# Patient Record
Sex: MALE | Race: WHITE | URBAN - METROPOLITAN AREA
[De-identification: names, ages, dates, MRNs, and addresses within clinical notes are randomized per-mention and may not be internally consistent; named-entity substitution may affect disease eponyms.]

---

## 2018-10-09 ENCOUNTER — ANTI-COAG VISIT (OUTPATIENT)
Dept: CARDIOLOGY CLINIC | Age: 58
End: 2018-10-09

## 2021-02-17 ENCOUNTER — HOSPITAL ENCOUNTER (OUTPATIENT)
Dept: PHYSICAL THERAPY | Age: 61
Setting detail: THERAPIES SERIES
Discharge: HOME | End: 2021-02-17
Payer: COMMERCIAL

## 2021-02-17 PROCEDURE — 97110 THERAPEUTIC EXERCISES: CPT | Performed by: PHYSICAL THERAPIST

## 2021-02-17 PROCEDURE — 97161 PT EVAL LOW COMPLEX 20 MIN: CPT | Performed by: PHYSICAL THERAPIST

## 2021-02-17 NOTE — PLAN OF CARE
[facility] and [facility]      Physical Therapy Certification    Dear Referring Practitioner: Antonieta Bruno NP,    We had the pleasure of evaluating the following patient for physical therapy services at [address] [place] [address]. A summary of our findings can be found in the initial assessment below. This includes our plan of care. If you have any questions or concerns regarding these findings, please do not hesitate to contact me at the office phone number checked above. Thank you for the referral.       Physician Signature:_______________________________Date:__________________  By signing above (or electronic signature), therapists plan is approved by physician      Patient: Mayra Otero   : 1960   MRN: [id number]  Referring Physician: Referring Practitioner: Antonieta Bruno NP      Evaluation Date: 2021      Medical Diagnosis Information:  Diagnosis: Herniated Lumbar Disc (ICD-722.100)(GSJ93-O69.26)   Treatment Diagnosis: M54.5, M25.651                                         Insurance information: PT Insurance Information: PT BENEFITS  FACILITY/ [facility]/ EFFECTIVE 2016/ ACTIVE/ DED 2400 .36/ PAYS 80%/ OOP 5900 .36/ NO VISIT LIMIT MED NEC/ 0 AUTH/ ALL CODES BILLABLE/ TELEHEALTH IS NOT COVERED UNLES COVID RELATED/ FARHEEN REF# [id number]/ 2021 PAG    C-SSRS Triggered by Intake questionnaire (Past 2 wk assessment):   [x] No, Questionnaire did not trigger screening.   [] Yes, Patient intake triggered further evaluation      [] C-SSRS Screening completed  [] PCP notified via Plan of Care  [] Emergency services notified     Latex Allergy:  [x]NO      []YES  Preferred Language for Healthcare:   [x]English       []other:    SUBJECTIVE: Patient stated complaint: Pt reports R back/leg pain that occurs when he goes on an extended walk (after half mile or mile). Starts to feel pain in lower back, back of leg, and into calf. Usually goes on a 4 mile walk every day for exercise and by the end needs to stop around every 100 yards and do a sidebend to the L in order to continue. Walks on mostly level ground, walking up or down on gradients has made it better in the past. Back/leg pain has been going on over a year, insidious onset, has been getting progressively worse. Doctor stated he had herniated disk at L4/L5. Has tried Tylenol for pain, does not help. Pain is sharp and burning, feels like he sprained his butt, calf feels like a cramp, \"locked up. \"  Had an epidural Thursday, pain relief for following two days;. yesterday was the worst its ever been. Pt has follow-up with Marcela March 8.       Hobbies: walking, hiking    Goals: walk pain-free    Relevant Medical History:see intake form; R AAYUSH 9/2020, L3/4 fusion 2018  Functional Disability Index  2/17/21: AUGUSTIN 60% deficit    Pain Scale: 0/10 current  Easing factors: stretch   Provocative factors: walking for long periods of time    Type: []Constant           [x]Intermittent      []Radiating         []Localized         []other: pain only occurs with prolonged walking                Numbness/Tingling: none     Occupation/School: retired     Living Status/Prior Level of Function: Independent with ADLs and IADLs     OBJECTIVE:     Standing Exam Normal Abnormal N/A    Toe walk     x    Heel Walk   x    Side bending x      Pelvic Height x      Fwd Bend- (aberrant juttering or innominate mvmt) x      Extension x      Trendelenburg x      Kemps/Quadrant   x    Stork  x  Minimal pelvic tilt B   SLS/SLS w rotation   x                  Seated Exam Normal Abnormal N/A Comments   Pelvic Height   x    Seated Rotation   x    Seated flexion x      B hip IR   x                  Supine Exam Normal Abnormal N/A Comments   Hip flexion x      Abduction  x  + for tightness   ER  x  + for tightness   IR  x  + for tightness   TAN/Ankush  x  + for tightness  R knee 32cm from table  L knee 18 cm from table achilles      S1-2 Prone knee bend      L3-4 Patellar tendon      C5-6 Biceps      C6 Brachioradialis      C7-8 Triceps      Clonus      Babinski      Mancia's        Joint mobility:    []Normal    [x]Hypo R hip d/t AAYUSH   []Hyper    Palpation: Assess next visit    Functional Mobility/Transfers: Independent with all mobility    Posture: WFL    Gait: (include devices/WB status) WFL                         [x] Patient history, allergies, meds reviewed. Medical chart reviewed. See intake form. Review Of Systems (ROS):  [x]Performed Review of systems (Integumentary, CardioPulmonary, Neurological) by intake and observation. Intake form has been scanned into medical record. Patient has been instructed to contact their primary care physician regarding ROS issues if not already being addressed at this time.        Co-morbidities/Complexities (which will affect course of rehabilitation):   []None              Arthritic conditions   []Rheumatoid arthritis (M05.9)  []Osteoarthritis (M19.91)    Cardiovascular conditions   []Hypertension (I10)  []Hyperlipidemia (E78.5)  []Angina pectoris (I20)  []Atherosclerosis (I70)    Musculoskeletal conditions   [x]Disc pathology   []Congenital spine pathologies   [x]Prior surgical intervention  []Osteoporosis (M81.8)  []Osteopenia (M85.8)   Endocrine conditions   []Hypothyroid (E03.9)  []Hyperthyroid Gastrointestinal conditions   []Constipation (B02.35)    Metabolic conditions   []Morbid obesity (E66.01)  []Diabetes type 1(E10.65) or 2 (E11.65)   []Neuropathy (G60.9)      Pulmonary conditions   []Asthma (J45)  []Coughing   []COPD (J44.9)    Psychological Disorders  [x]Anxiety (F41.9)  [x]Depression (F32.9)   []Other:    []Other:             Barriers to/and or personal factors that will affect rehab potential:              []Age  []Sex              [x]Motivation                        []Co-Morbidities              []Cognitive Function, education/learning barriers []Environmental, home barriers              []profession/work barriers  [x]past PT/medical experience  []other:  Justification: Pt is familiar with PT due to past surgeries; pt is active and has strong desire to return to [facility]      Falls Risk Assessment (30 days):   [x] Falls Risk assessed and no intervention required. [] Falls Risk assessed and Patient requires intervention due to being higher risk   TUG score (>12s at risk):     [] Falls education provided, including      ASSESSMENT:   Functional Impairments:                [x]Noted lumbar/proximal hip hypomobility              []Noted lumbosacral and/or generalized hypermobility              [x]Decreased Lumbosacral/hip/LE functional ROM              [x]Decreased core/proximal hip strength and neuromuscular control               []Decreased LE functional strength               []Abnormal reflexes/sensation/myotomal/dermatomal deficits  []Reduced balance/proprioceptive control               []other:       Functional Activity Limitations (from functional questionnaire and intake)              []Reduced ability to tolerate prolonged functional positions              []Reduced ability or difficulty with changes of positions or transfers between positions              []Reduced ability to maintain good posture and demonstrate good body mechanics with sitting, bending, and lifting              []Reduced ability to sleep              [] Reduced ability or tolerance with driving and/or computer work              []Reduced ability to perform lifting, reaching, carrying tasks              []Reduced ability to squat              []Reduced ability to forward bend              []Reduced ability to ambulate prolonged functional periods/distances/surfaces              []Reduced ability to ascend/descend stairs              [x]other: Reduced ability to walk for prolonged periods.       Participation Restrictions              []Reduced participation in self care activities and measures addressing any of the following: body structures and functions (impairments), activity limitations, and/or participation restrictions;:  [] a total of 1-2 or more elements   [x] a total of 3 or more elements   [] a total of 4 or more elements   [x] A clinical presentation with:  [x] stable and/or uncomplicated characteristics   [] evolving clinical presentation with changing characteristics  [] unstable and unpredictable characteristics;   [x] Clinical decision making of [x] low, [] moderate, [] high complexity using standardized patient assessment instrument and/or measurable assessment of functional outcome. [x] EVAL (LOW) 09865 (typically 20 minutes face-to-face)  [] EVAL (MOD) 23952 (typically 30 minutes face-to-face)  [] EVAL (HIGH) 47944 (typically 45 minutes face-to-face)  [] RE-EVAL         PLAN:      Frequency/Duration:  1-2 days per week for 8 Weeks:  Interventions:  [x]  Therapeutic exercise including: strength training, ROM, for LE, Glutes and core   [x]  NMR activation and proprioception for glutes , LE and Core   [x]  Manual therapy as indicated for Hip complex, LE and spine to include: Dry Needling/IASTM, STM, PROM, Gr I-IV mobilizations, manipulation. [x]  Modalities as needed that may include: thermal agents, E-stim, Biofeedback, US, iontophoresis as indicated  [x]  Patient education on joint protection, postural re-education, activity modification, progression of HEP. HEP instruction:     Access Code: [email or web address]   URL: [email or web address]   Date: 02/17/2021   Prepared by: Dayton Medel     Exercises   Supine Figure 4 Piriformis Stretch - 3 reps - 1 sets - 30 second hold - 1x daily - 7x weekly   Supine ITB Stretch with Strap - 3 reps - 1 sets - 30 second hold - 1x daily - 7x weekly   Prone Quadriceps Stretch with Strap - 3 reps - 1 sets - 30 second hold - 1x daily - 7x weekly   Clamshell with Resistance - 10 reps - 3 sets - 1x daily - 7x weekly   Supine Bridge - 10 reps - 3 sets - 1x daily - 7x weekly        GOALS:   Patient stated goal: to walk long distances painfree    [] Progressing: [] Met: [] Not Met: [] Adjusted    Therapist goals for Patient:   Short Term Goals: To be achieved in: 4 weeks  3/14/21  1. Independent in HEP and progression per patient tolerance, in order to prevent re-injury. (at 1 week 2/24/21)  [] Progressing: [] Met: [] Not Met: [] Adjusted   2. Patient will have demonstrate PROM [place] [place] for R hip to improve hip mobility. [] Progressing: [] Met: [] Not Met: [] Adjusted   3. Patient will tolerate 2 miles of walking without increased symptoms or restriction. [] Progressing: [] Met: [] Not Met: [] Adjusted    Long Term Goals: To be achieved in: 8 weeks 4/14/21  1. Disability index score of 0% for the AUGUSTIN to assist with reaching prior level of function. [] Progressing: [] Met: [] Not Met: [] Adjusted  2. Patient will demonstrate increased R hip AROM to [facility] [place] and normal LS mobility to allow for proper joint functioning as indicated by patients Functional Deficits. [] Progressing: [] Met: [] Not Met: [] Adjusted   3. Patient will demonstrate an increase in Strength to at least 4+/5 proximal hip and core activation to allow for proper functional mobility as indicated by patients Functional Deficits. [] Progressing: [] Met: [] Not Met: [] Adjusted   4. Patient will return to walking 4 miles without increased symptoms or restriction. [] Progressing: [] Met: [] Not Met: [] Adjusted  5. Patient will return to hiking without increased symptoms or restriction. [] Progressing: [] Met: [] Not Met: [] Adjusted        Electronically signed by: Alok Cohen, PT, DPT, OCS  Physical Therapist  [email or web address]  [email or web address]    Harley Hunt, PRINCE  Therapist was present, directed the patient's care, made skilled judgement, and was responsible for assessment and treatment of the patient.

## 2021-02-17 NOTE — FLOWSHEET NOTE
[address] [facility]    Physical Therapy Daily Treatment Note  Date:  2021    Patient Name:  Tiffanie Quiros    :  1960  MRN: [id number]  Restrictions/Precautions:    Medical/Treatment Diagnosis Information:  · Diagnosis: Herniated Lumbar Disc (ICD-722.100)(CQT05-B25.26)  · Treatment Diagnosis: M54.5, O76.174  Insurance/Certification information:  PT Insurance Information: [employer]  Physician Information:  Referring Practitioner: Jany Lemus NP  Has the plan of care been signed (Y/N):        []  Yes  [x]  No     Date of Patient follow up with Physician: 3/8/21 [place]      Is this a Progress Report:     []  Yes  [x]  No        If Yes:  Date Range for reporting period:  Beginning  Ending    Progress report will be due (10 Rx or 30 days whichever is less):       Recertification will be due (POC Duration  / 90 days whichever is less): 21        Visit # Insurance Allowable Auth Required   1 [place]limited per [place] []  Yes []  No        Functional Scale:    Date assessed:  AUGUSTIN 60% disability    21     Latex Allergy:  [x]NO      []YES  Preferred Language for Healthcare:   [x]English       []other:    Pain level:  0/10     SUBJECTIVE:  See eval    OBJECTIVE:      Standing Exam Normal Abnormal N/A     Toe walk       x     Heel Walk     x     Side bending x         Pelvic Height x         Fwd Bend- (aberrant juttering or innominate mvmt) x         Extension x         Trendelenburg x         Kemps/Quadrant     x     Stork   x   Minimal pelvic tilt B   SLS/SLS w rotation     x                             Seated Exam Normal Abnormal N/A Comments   Pelvic Height     x     Seated Rotation     x     Seated flexion x         B hip IR     x                             Supine Exam Normal Abnormal N/A Comments   Hip flexion x         Abduction   x   + for tightness   ER   x   + for tightness   IR   x   + for tightness dynamic tension testing Normal Abnormal Comments   Slump Test  - Degree of knee flexion:  x       SLR          0-30 x       30-70 x       Femoral nerve (L2-4) x          Not assessed  Reflexes Normal Abnormal Comments   S1-2 Seated achilles         S1-2 Prone knee bend         L3-4 Patellar tendon         C5-6 Biceps         C6 Brachioradialis         C7-8 Triceps         Clonus         Babinski         Mancia's            Joint mobility:              []?Normal                      [x]? Hypo R hip d/t AAYUSH             []?Hyper     Palpation: Assess next visit     Functional Mobility/Transfers: Independent with all mobility     Posture: WFL     Gait: (include devices/WB status) WFL      RESTRICTIONS/PRECAUTIONS: none    Exercises/Interventions:   ROM/stretches     Quad 3x30\" Prone with strap   Figure 4 3x30\"    Hip IR Attempted, did not feel stretch Hooklying, L leg providing OP into IR   ITB stretch 3x30\" using strap Supine                       Strengthening     Clamshells 3x10 blue band Sidelying   Bridges 3x10                                               Plan for next session: progress as tolerated; TA series, manual IR/ER rotation stretching, squats, QL stretch, hamstring stretch      Therapeutic Exercise and NMR EXR  [x] (23597) Provided verbal/tactile cueing for activities related to strengthening, flexibility, endurance, ROM  for improvements in proximal hip and core control with self care, mobility, lifting and ambulation.  [] (89460) Provided verbal/tactile cueing for activities related to improving balance, coordination, kinesthetic sense, posture, motor skill, proprioception  to assist with core control in self care, mobility, lifting, and ambulation.      Therapeutic Activities:    [] (40012 or 12201) Provided verbal/tactile cueing for activities related to improving balance, coordination, kinesthetic sense, posture, motor skill, proprioception and motor activation to allow for proper function  with self care and ADLs  [] (41445) Provided training and instruction to the patient for proper core and proximal hip recruitment and positioning with ambulation re-education     Home Exercise Program:    2/17/21. Printed handout provided. Access Code: [email or web address]   URL: [email or web address]   Date: 02/17/2021   Prepared by: Edwin Taylor     Exercises   · Supine Figure 4 Piriformis Stretch - 3 reps - 1 sets - 30 second hold - 1x daily - 7x weekly   · Supine ITB Stretch with Strap - 3 reps - 1 sets - 30 second hold - 1x daily - 7x weekly   · Prone Quadriceps Stretch with Strap - 3 reps - 1 sets - 30 second hold - 1x daily - 7x weekly   · Clamshell with Resistance - 10 reps - 3 sets - 1x daily - 7x weekly   · Supine Bridge - 10 reps - 3 sets - 1x daily - 7x weekly      [x] (91869) Reviewed/Progressed HEP activities related to strengthening, flexibility, endurance, ROM of core, proximal hip and LE for functional self-care, mobility, lifting and ambulation   [] (03574) Reviewed/Progressed HEP activities related to improving balance, coordination, kinesthetic sense, posture, motor skill, proprioception of core, proximal hip and LE for self care, mobility, lifting, and ambulation      Manual Treatments:  PROM / STM / Oscillations-Mobs:  G-I, II, III, IV (PA's, Inf., Post.)  [] (96450) Provided manual therapy to mobilize proximal hip and LS spine soft tissue/joints for the purpose of modulating pain, promoting relaxation,  increasing ROM, reducing/eliminating soft tissue swelling/inflammation/restriction, improving soft tissue extensibility and allowing for proper ROM for normal function with self care, mobility, lifting and ambulation.      Modalities: none      Charges:  Timed Code Treatment Minutes: 25   Total Treatment Minutes: 55   Time in: 2:35  Time out: 3:30    [x] EVAL (LOW) 99649 (typically 20 minutes face-to-face)  [] EVAL (MOD) 34067 (typically 30 minutes face-to-face)  [] EVAL (HIGH) 97077 (typically 45 minutes face-to-face)  [] RE-EVAL     [x] XB(97764) x 2    [] IONTO  [] NMR (40452) x     [] VASO  [] Manual (00277) x      [] Other:  [] TA x      [] Mech Traction (64921)  [] ES(attended) (52521)      [] ES (un) (31757):     Goals:   Patient stated goal: to walk long distances painfree    []? Progressing: []? Met: []? Not Met: []? Adjusted     Therapist goals for Patient:   Short Term Goals: To be achieved in: 4 weeks  3/14/21  1. Independent in HEP and progression per patient tolerance, in order to prevent re-injury. (at 1 week 2/24/21)  []? Progressing: []? Met: []? Not Met: []? Adjusted   2. Patient will have demonstrate PROM [employer] [place] for R hip to improve hip mobility. []? Progressing: []? Met: []? Not Met: []? Adjusted   3. Patient will tolerate 2 miles of walking without increased symptoms or restriction. []? Progressing: []? Met: []? Not Met: []? Adjusted     Long Term Goals: To be achieved in: 8 weeks 4/14/21  1. Disability index score of 0% for the AUGUSTIN to assist with reaching prior level of function. []? Progressing: []? Met: []? Not Met: []? Adjusted  2. Patient will demonstrate increased R hip AROM to [employer] and normal LS mobility to allow for proper joint functioning as indicated by patients Functional Deficits.   []? Progressing: []? Met: []? Not Met: []? Adjusted   3. Patient will demonstrate an increase in Strength to at least 4+/5 proximal hip and core activation to allow for proper functional mobility as indicated by patients Functional Deficits. []? Progressing: []? Met: []? Not Met: []? Adjusted   4. Patient will return to walking 4 miles without increased symptoms or restriction. []? Progressing: []? Met: []? Not Met: []? Adjusted  5. Patient will return to hiking without increased symptoms or restriction. []? Progressing: []? Met: []? Not Met: []?  Adjusted         Overall Progression Towards Functional goals/ Treatment Progress Update:  [] Patient is progressing as expected towards functional goals listed. [] Progression is slowed due to complexities/Impairments listed. [] Progression has been slowed due to co-morbidities. [x] Plan just implemented, too soon to assess goals progression <30days   [] Goals require adjustment due to lack of progress  [] Patient is not progressing as expected and requires additional follow up with physician  [] Other    Prognosis for POC: [x] Good [] Fair  [] Poor      Patient requires continued skilled intervention: [x] Yes  [] No    Treatment/Activity Tolerance:  [] Patient able to complete treatment  [] Patient limited by fatigue  [] Patient limited by pain     [] Patient limited by other medical complications  [] Other: Pt's symptoms consistent with referral pattern of sciatic nerve. Pt demonstrates impaired IR/ER PROM of R hip and proximal hip weakness. Pt's symptoms were unable to be reproduced with any of the testing positions, does not exhibit a directional preference. Because pt only experiences back/leg pain during prolonged walking, pt may be experiencing pain due to lack of muscular endurance. Pt tolerated figure 4 stretch well, felt a stretch through the glutes/lateral hip. Pt did not feel a stretch with hooklying IR stretch, felt more of a stretch with supine ITB stretch position. Pt exhibited shaking in the leg with ITB stretch, felt a stretch through calf initially. Pt reported feeling a pretty good stretch with prone quad stretch. Pt experienced slight hamstring cramping with bridges, multiple verbal cues needed to ensure continual breathing throughout exercise. Pt benefited from self-palpation of glutes to provide feedback for correct activation of glutes/ER during clamshells. Pt to benefit from PT to address ROM and strength deficits to allow for painfree prolonged walking.       Prognosis: [] Good [] Fair  [] Poor    Patient Requires Follow-up: [x] Yes  [] No    PLAN: See eval  [] Continue per plan of care [] Alter current plan (see comments)  [x]

## 2021-02-24 ENCOUNTER — HOSPITAL ENCOUNTER (OUTPATIENT)
Dept: PHYSICAL THERAPY | Age: 61
Setting detail: THERAPIES SERIES
Discharge: HOME | End: 2021-02-24
Payer: COMMERCIAL

## 2021-02-24 PROCEDURE — 97112 NEUROMUSCULAR REEDUCATION: CPT | Performed by: PHYSICAL THERAPIST

## 2021-02-24 PROCEDURE — 97110 THERAPEUTIC EXERCISES: CPT | Performed by: PHYSICAL THERAPIST

## 2021-02-24 NOTE — FLOWSHEET NOTE
[facility]    [facility] Therapy Daily Treatment Note  Date:  2021    Patient Name:  Randall Gabriel    :  1960  MRN: [id number]  Restrictions/Precautions:    Medical/Treatment Diagnosis Information:        Insurance/Certification information:     Physician Information:     Has the plan of care been signed (Y/N):        []  Yes  [x]  No     Date of Patient follow up with Physician: 3/8/21 [place]      Is this a Progress Report:     []  Yes  [x]  No        If Yes:  Date Range for reporting period:  Beginning  Ending    Progress report will be due (10 Rx or 30 days whichever is less): 3/84/38      Recertification will be due (POC Duration  / 90 days whichever is less): 21        Visit # Insurance Allowable Auth Required   2 Unlimited per [place] []  Yes []  No        Functional Scale:    Date assessed:  AUGUSTIN 60% disability    21     Latex Allergy:  [x]NO      []YES  Preferred Language for Healthcare:   [x]English       []other:    Pain level:  0/10     SUBJECTIVE:  Pt stated that his back feels fine, hasn't noticed any improvement or increase in pain since starting his home exercise program. Only experiences pain with prolonged walking.     OBJECTIVE:      Standing Exam Normal Abnormal N/A     Toe walk       x     Heel Walk     x     Side bending x         Pelvic Height x         Fwd Bend- (aberrant juttering or innominate mvmt) x         Extension x         Trendelenburg x         Kemps/Quadrant     x     Stork   x   Minimal pelvic tilt B   SLS/SLS w rotation     x                             Seated Exam Normal Abnormal N/A Comments   Pelvic Height     x     Seated Rotation     x     Seated flexion x         B hip IR     x                             Supine Exam Normal Abnormal N/A Comments   Hip flexion x         Abduction   x   + for tightness   ER   x   + for tightness   IR   x   + for tightness   TAN/Ankush   x   + for tightness  R knee 32cm from table  L knee 18 cm from table   Hip scour     x     SLR x         Crossed SLR x         Supine to sit     x     SI distraction/compression     x     Hip thrust     x                             Prone Exam Normal Abnormal N/A Comments   Prone knee bend     x     Prone hip IR     x     B Achilles reflex/Pheasant     x     PA/Spring     x     Prone Instability test     x     Sacral Spring/thrust     x     Ely's   x   + for tightness B   Posterior chain NM firing       R: glute/HS simultaneous, ES  L: glute, HS, ES      ROM LEFT RIGHT Comments   Lumbar Flex     17 cm using tape measure, C7 to L5   Lumbar Ext     6 cm using tape measure, C7 to L5   [place] [place] [facility]     Rotation     Not assessed                          ROM LEFT RIGHT Comments   Hip Flexion WFL WFL Supine   Hip Abd 29 22 Supine   Hip ER 47 20 Supine 90/90   Hip IR 25 19 Supine 90/90   Hip Extension     Not assessed   Knee Ext     Not assessed   Knee Flex     Not assessed   Hamstring Flex 60 72 SLR                                    Strength LEFT RIGHT Comments   Multifidus     Not assessed   Transverse Ab     Not assessed   Hip Flexors 4+/5 4+/5     Hip Abductors 4/5  4-/5 glute med 4/5  4-/5 glute med     Hip Extensors 4/5 4/5     Quads 4+/5 4+/5     Hamstrings 4+/5 4+/5     Hip IR 4/5 4+/5     Hip ER 4+/5 4/5     Plantarflexion 4+/5 4+/5     Dorsiflexion 4+/5 4+/5        Myotomes Normal Abnormal Comments   Hip flexion (L1-L2) x       Knee extension (L2-L4) x       Dorsiflexion (L4-L5) x       Great Toe Ext (L5)     Not assessed   Ankle Eversion (S1-S2)     Not assessed   Ankle PF(S1-S2) x          Not assessed  Dermatomes Normal Abnormal Comments   inguinal area (L1)          anterior mid-thigh (L2)         distal ant thigh/med knee (L3)         medial lower leg and foot (L4)         lateral lower leg and foot (L5)         posterior calf (S1)         medial calcaneus (S2)               Neural dynamic tension testing Normal Abnormal Comments   Slump Test  - Degree of knee flexion:  x       SLR          0-30 x       30-70 x       Femoral nerve (L2-4) x          Not assessed  Reflexes Normal Abnormal Comments   S1-2 Seated achilles         S1-2 Prone knee bend         L3-4 Patellar tendon         C5-6 Biceps         C6 Brachioradialis         C7-8 Triceps         Clonus         Babinski         Mancia's            Joint mobility:              []?Normal                      [x]? Hypo R hip d/t AAYUSH             []?Hyper     Palpation: None     Functional Mobility/Transfers: Independent with all mobility     Posture: WFL     Gait: (include devices/WB status) WFL      RESTRICTIONS/PRECAUTIONS: none    Exercises/Interventions:   ROM/stretches     Quad 3x30\" Prone with strap   Figure 4 3x30\"    Hip IR  Hooklying, L leg providing OP into IR   ITB stretch 3x30\" using strap Supine   Hamstring 3x30\" Seated   QL 3x30\" Standing             Strengthening     Clamshells 3x10 B red loop Sidelying   Bridges 3x10     Squats 3x10 At half wall for support   TA brace 10x10\"    Supine Deadbug 2x10 B One leg at 90/90, heel tap to table   Sidestepping 3 passes Red loop 1 pass, blue loop used last 2 passes        Manual      Hip IR stretch 3' Supine, 90/90               Plan for next session: progress as tolerated      Therapeutic Exercise and NMR EXR  [x] (26558) Provided verbal/tactile cueing for activities related to strengthening, flexibility, endurance, ROM  for improvements in proximal hip and core control with self care, mobility, lifting and ambulation.  [] (56212) Provided verbal/tactile cueing for activities related to improving balance, coordination, kinesthetic sense, posture, motor skill, proprioception  to assist with core control in self care, mobility, lifting, and ambulation.      Therapeutic Activities:    [] (82299 or 67722) Provided verbal/tactile cueing for activities related to improving balance, coordination, kinesthetic sense, posture, motor skill, proprioception and motor activation to allow for proper function  with self care and ADLs  [] (70744) Provided training and instruction to the patient for proper core and proximal hip recruitment and positioning with ambulation re-education     Home Exercise Program:     2/17/21. Printed handout provided. Updated 2/24/21. Patient has access via mobile lester. Access Code: [email or web address]   URL: [email or web address]   Date: 02/17/2021   Prepared by: Rayna Umaña     Exercises   Seated Table Hamstring Stretch - 3 reps - 30 second hold - 1x daily - 7x weekly   Supine Figure 4 Piriformis Stretch - 3 reps - 1 sets - 30 second hold - 1x daily - 7x weekly   Prone Quadriceps Stretch with Strap - 3 reps - 1 sets - 30 second hold - 1x daily - 7x weekly   Standing Quadratus Lumborum Stretch with Doorway - 10 reps - 3 sets - 1x daily - 7x weekly   Clamshell with Resistance - 10 reps - 3 sets - 1x daily - 7x weekly   Supine Bridge - 10 reps - 3 sets - 1x daily - 7x weekly   Side Stepping with Resistance at Thighs - 10 reps - 3 sets - 1x daily - 7x weekly   Squat - 10 reps - 3 sets - 1x daily - 7x weekly   Supine Transversus Abdominis Bracing - Hands on Stomach - 10 reps - 10 second hold - 1x daily - 7x weekly   Supine March - 10 reps - 3 sets - 1x daily - 7x weekly      [x] (13815) Reviewed/Progressed HEP activities related to strengthening, flexibility, endurance, ROM of core, proximal hip and LE for functional self-care, mobility, lifting and ambulation   [] (63297) Reviewed/Progressed HEP activities related to improving balance, coordination, kinesthetic sense, posture, motor skill, proprioception of core, proximal hip and LE for self care, mobility, lifting, and ambulation      Manual Treatments:  PROM / STM / Oscillations-Mobs:  G-I, II, III, IV (PA's, Inf., Post.)  [] ([phone]) Provided manual therapy to mobilize proximal hip and LS spine soft tissue/joints for the purpose of modulating pain, promoting relaxation,  increasing ROM, reducing/eliminating soft tissue swelling/inflammation/restriction, improving soft tissue extensibility and allowing for proper ROM for normal function with self care, mobility, lifting and ambulation. Modalities: none      Charges:  Timed Code Treatment Minutes: 45   Total Treatment Minutes: 45   Time in: 11:30  Time out: 12:15    [x] EVAL (LOW) 22580 (typically 20 minutes face-to-face)  [] EVAL (MOD) 67672 (typically 30 minutes face-to-face)  [] EVAL (HIGH) 25483 (typically 45 minutes face-to-face)  [] RE-EVAL     [x] JY(88218) x 2    [] IONTO  [x] NMR (76182) x 1    [] VASO  [] Manual (70324) x      [] Other:  [] TA x      [] Mech Traction (70791)  [] ES(attended) (54850)      [] ES (un) (22178):     Goals:   Patient stated goal: to walk long distances painfree    []? Progressing: []? Met: []? Not Met: []? Adjusted     Therapist goals for Patient:   Short Term Goals: To be achieved in: 4 weeks  3/14/21  1. Independent in HEP and progression per patient tolerance, in order to prevent re-injury. (at 1 week 2/24/21)  []? Progressing: []? Met: []? Not Met: []? Adjusted   2. Patient will have demonstrate PROM [place] [place] for R hip to improve hip mobility. []? Progressing: []? Met: []? Not Met: []? Adjusted   3. Patient will tolerate 2 miles of walking without increased symptoms or restriction. []? Progressing: []? Met: []? Not Met: []? Adjusted     Long Term Goals: To be achieved in: 8 weeks 4/14/21  1. Disability index score of 0% for the AUGUSTIN to assist with reaching prior level of function. []? Progressing: []? Met: []? Not Met: []? Adjusted  2. Patient will demonstrate increased R hip AROM to [facility] [place] and normal LS mobility to allow for proper joint functioning as indicated by patients Functional Deficits.   []? Progressing: []? Met: []? Not Met: []? Adjusted   3.  Patient will demonstrate an increase in Strength to at least 4+/5 proximal hip and core activation to allow for proper wall and UE support helped to keep knees from moving beyond toes. Pt tolerated addition of sidewalking with band resistance, verbal cues to keep feet shoulder width apart when bringing legs back together. Pt experienced a good stretch with standing QL stretch. Pt to benefit from PT to address ROM and strength deficits to allow for painfree prolonged walking. Prognosis: [] Good [] Fair  [] Poor    Patient Requires Follow-up: [x] Yes  [] No    PLAN: See eval  [] Continue per plan of care [] Alter current plan (see comments)  [x] Plan of care initiated [] Hold pending MD visit [] Discharge    Electronically signed by: Daniela Swan PT, DPT, OCS  Physical Therapist  [id number]  [email or web address]    Kandi Javed, [facility]  Therapist was present, directed the patient's care, made skilled judgement, and was responsible for assessment and treatment of the patient. *If patient does not return for further follow ups after this date. Please consider this as the patients discharge from physical therapy.

## 2021-02-26 ENCOUNTER — HOSPITAL ENCOUNTER (OUTPATIENT)
Dept: PHYSICAL THERAPY | Age: 61
Setting detail: THERAPIES SERIES
Discharge: HOME | End: 2021-02-26
Payer: COMMERCIAL

## 2021-02-26 PROCEDURE — 97110 THERAPEUTIC EXERCISES: CPT | Performed by: PHYSICAL THERAPIST

## 2021-03-02 ENCOUNTER — HOSPITAL ENCOUNTER (OUTPATIENT)
Dept: PHYSICAL THERAPY | Age: 61
Setting detail: THERAPIES SERIES
Discharge: HOME | End: 2021-03-02
Payer: COMMERCIAL

## 2021-03-02 PROCEDURE — 97112 NEUROMUSCULAR REEDUCATION: CPT | Performed by: PHYSICAL THERAPIST

## 2021-03-02 PROCEDURE — 97110 THERAPEUTIC EXERCISES: CPT | Performed by: PHYSICAL THERAPIST

## 2021-03-02 NOTE — FLOWSHEET NOTE
[address] [facility]    Physical Therapy Daily Treatment Note  Date:  3/2/2021    Patient Name:  Pamela Llamas    :  1960  MRN: [id number]  Restrictions/Precautions:    Medical/Treatment Diagnosis Information:  Referring Physician: Referring Practitioner: Pilar Kent NP                                       Evaluation Date: 2021                                       Medical Diagnosis Information:  Diagnosis: Herniated Lumbar Disc (ICD-722.100)(INC19-N31.26)   Treatment Diagnosis: M54.5, M25.651                                         Insurance information: PT Insurance Information: PT BENEFITS  FACILITY/ [facility]/ EFFECTIVE 2016/ ACTIVE/ DED 2400 .36/ PAYS 80%/ OOP 5900 .36/ NO VISIT LIMIT MED NEC/ 0 AUTH/ ALL CODES BILLABLE/ TELEHEALTH IS NOT COVERED UNLES COVID RELATED/ FARHEEN REF# [id number]/ 2021 PAG  Has the plan of care been signed (Y/N):        []  Yes  [x]  No     Date of Patient follow up with Physician: 3/8/21 Marcela      Is this a Progress Report:     []  Yes  [x]  No        If Yes:  Date Range for reporting period:  Beginning  Ending    Progress report will be due (10 Rx or 30 days whichever is less): 87      Recertification will be due (POC Duration  / 90 days whichever is less): 21        Visit # Insurance Allowable Auth Required   4 Unlimited per [place] []  Yes []  No        Functional Scale:    Date assessed:  AUGUSTIN 60% disability    21     Latex Allergy:  [x]NO      []YES  Preferred Language for Healthcare:   [x]English       []other:    Pain level:  0/10     SUBJECTIVE: Pt went for walk about 3pm on Friday, had same pain from low back down to his foot that he c/o on Wednesday when walking after PT; pain sets in after 1/2 mile but before 1 mile. Saturday walked and his pain with 4 miles was \"average to a little better\", no walk , yesterday able to walk 4 miles without issue.  States that PT HEP is fine, does not cause any pain.     OBJECTIVE:      Standing Exam Normal Abnormal N/A     Toe walk       x     Heel Walk     x     Side bending x         Pelvic Height x         Fwd Bend- (aberrant juttering or innominate mvmt) x         Extension x         Trendelenburg x         Kemps/Quadrant     x     Stork   x   Minimal pelvic tilt B   SLS/SLS w rotation     x                             Seated Exam Normal Abnormal N/A Comments   Pelvic Height     x     Seated Rotation     x     Seated flexion x         B hip IR     x                             Supine Exam Normal Abnormal N/A Comments   Hip flexion x         Abduction   x   + for tightness   ER   x   + for tightness   IR   x   + for tightness   TAN/Ankush   x   + for tightness  R knee 32cm from table  L knee 18 cm from table   Hip scour     x     SLR x         Crossed SLR x         Supine to sit     x     SI distraction/compression     x     Hip thrust     x                             Prone Exam Normal Abnormal N/A Comments   Prone knee bend     x     Prone hip IR     x     B Achilles reflex/Pheasant     x     PA/Spring     x     Prone Instability test     x     Sacral Spring/thrust     x     Ely's   x   + for tightness B   Posterior chain NM firing       R: glute/HS simultaneous, ES  L: glute, HS, ES      ROM LEFT RIGHT Comments   Lumbar Flex     17 cm using tape measure, C7 to L5   Lumbar Ext     6 cm using tape measure, C7 to L5   Side Bend [place] [place]     Rotation     Not assessed                          ROM LEFT RIGHT Comments   Hip Flexion WFL WFL Supine   Hip Abd 29 22 Supine   Hip ER 47 20 Supine 90/90   Hip IR 25 19 Supine 90/90   Hip Extension     Not assessed   Knee Ext     Not assessed   Knee Flex     Not assessed   Hamstring Flex 60 72 SLR                                    Strength LEFT RIGHT Comments   Multifidus     Not assessed   Transverse Ab     Not assessed   Hip Flexors 4+/5 4+/5     Hip Abductors 4/5  4-/5 glute med 4/5  4-/5 glute med     Hip Extensors 4/5 4/5     Quads 4+/5 4+/5     Hamstrings 4+/5 4+/5     Hip IR 4/5 4+/5     Hip ER 4+/5 4/5     Plantarflexion 4+/5 4+/5     Dorsiflexion 4+/5 4+/5        Myotomes Normal Abnormal Comments   Hip flexion (L1-L2) x       Knee extension (L2-L4) x       Dorsiflexion (L4-L5) x       Great Toe Ext (L5)     Not assessed   Ankle Eversion (S1-S2)     Not assessed   Ankle PF(S1-S2) x          Not assessed  Dermatomes Normal Abnormal Comments   inguinal area (L1)          anterior mid-thigh (L2)         distal ant thigh/med knee (L3)         medial lower leg and foot (L4)         lateral lower leg and foot (L5)         posterior calf (S1)         medial calcaneus (S2)               Neural dynamic tension testing Normal Abnormal Comments   Slump Test  - Degree of knee flexion:  x       SLR          0-30 x       30-70 x       Femoral nerve (L2-4) x          Not assessed  Reflexes Normal Abnormal Comments   S1-2 Seated achilles         S1-2 Prone knee bend         L3-4 Patellar tendon         C5-6 Biceps         C6 Brachioradialis         C7-8 Triceps         Clonus         Babinski         Mancia's            Joint mobility:              []?Normal                      [x]? Hypo R hip d/t AAYUSH             []?Hyper     Palpation: None     Functional Mobility/Transfers: Independent with all mobility     Posture: WFL     Gait: (include devices/WB status) WFL      RESTRICTIONS/PRECAUTIONS: none    Exercises/Interventions:   ROM/stretches     Quad 3x30\" R Prone with strap   Figure 4 3x30\" B    Hip IR  Hooklying, L leg providing OP into IR   ITB stretch  Supine   Hamstring 3x30\" R Seated     Standing  HEP             Strengthening     Clamshells 3x10 R only Sidelying  Blue loop above knees   Bridges 3x10 bosu      At half wall for support  HEP   TA brace     Supine Deadbug 3x10 R only One leg at 90/90, heel tap to table     HEP   Sidelying hip/knee flexion 3x10    ABD SLR with ext 3x10    Prone SLR-EOB          Planks 3x30\" On forearms; 30\" break between reps        Blue loop above knees  HEP   Monster walks 2 passes Blue loop above knees  Forward/backward        SL leg press                    Manual      Hip IR stretch 3' Supine, 90/90               Plan for next session: progress as tolerated; SL leg press, prone SLR EOB      Therapeutic Exercise and NMR EXR  [x] (48955) Provided verbal/tactile cueing for activities related to strengthening, flexibility, endurance, ROM  for improvements in proximal hip and core control with self care, mobility, lifting and ambulation.  [] (41202) Provided verbal/tactile cueing for activities related to improving balance, coordination, kinesthetic sense, posture, motor skill, proprioception  to assist with core control in self care, mobility, lifting, and ambulation. Therapeutic Activities:    [] (35412 or 73648) Provided verbal/tactile cueing for activities related to improving balance, coordination, kinesthetic sense, posture, motor skill, proprioception and motor activation to allow for proper function  with self care and ADLs  [] (88364) Provided training and instruction to the patient for proper core and proximal hip recruitment and positioning with ambulation re-education     Home Exercise Program:     2/17/21. Printed handout provided. Updated 2/24/21. Patient has access via mobile lester. Access Code: [email or web address]   URL: [email or web address]   Date: 02/17/2021   Prepared by: Chucho Jospeh     Exercises   Seated Table Hamstring Stretch - 3 reps - 30 second hold - 1x daily - 7x weekly   Supine Figure 4 Piriformis Stretch - 3 reps - 1 sets - 30 second hold - 1x daily - 7x weekly   Prone Quadriceps Stretch with Strap - 3 reps - 1 sets - 30 second hold - 1x daily - 7x weekly   Standing Quadratus Lumborum Stretch with Doorway - 10 reps - 3 sets - 1x daily - 7x weekly   Clamshell with Resistance - 10 reps - 3 sets - 1x daily - 7x weekly   Supine Bridge - 10 reps - 3 sets - 1x daily - 7x weekly   Side Stepping with Resistance at Thighs - 10 reps - 3 sets - 1x daily - 7x weekly   Squat - 10 reps - 3 sets - 1x daily - 7x weekly   Supine Transversus Abdominis Bracing - Hands on Stomach - 10 reps - 10 second hold - 1x daily - 7x weekly   Supine March - 10 reps - 3 sets - 1x daily - 7x weekly      [x] (30472) Reviewed/Progressed HEP activities related to strengthening, flexibility, endurance, ROM of core, proximal hip and LE for functional self-care, mobility, lifting and ambulation   [] (34889) Reviewed/Progressed HEP activities related to improving balance, coordination, kinesthetic sense, posture, motor skill, proprioception of core, proximal hip and LE for self care, mobility, lifting, and ambulation      Manual Treatments:  PROM / STM / Oscillations-Mobs:  G-I, II, III, IV (PA's, Inf., Post.)  [] (98114) Provided manual therapy to mobilize proximal hip and LS spine soft tissue/joints for the purpose of modulating pain, promoting relaxation,  increasing ROM, reducing/eliminating soft tissue swelling/inflammation/restriction, improving soft tissue extensibility and allowing for proper ROM for normal function with self care, mobility, lifting and ambulation. Modalities: none      Charges:  Timed Code Treatment Minutes: 45   Total Treatment Minutes: 45   Time in: 10:16  Time out: 11:01     [] EVAL (LOW) 16074 (typically 20 minutes face-to-face)  [] EVAL (MOD) 13138 (typically 30 minutes face-to-face)  [] EVAL (HIGH) 61797 (typically 45 minutes face-to-face)  [] RE-EVAL     [x] GW(24004) x 2    [] IONTO  [x] NMR (38727) x 1    [] VASO  [] Manual (74520) x      [] Other:  [] TA x      [] Mech Traction (51494)  [] ES(attended) (96388)      [] ES (un) (56320):     Goals:   Patient stated goal: to walk long distances painfree    []? Progressing: []? Met: []? Not Met: []? Adjusted     Therapist goals for Patient:   Short Term Goals: To be achieved in: 4 weeks  3/14/21  1.  Independent in HEP and progression per patient tolerance, in order to prevent re-injury. (at 1 week 2/24/21)  []? Progressing: []? Met: []? Not Met: []? Adjusted   2. Patient will have demonstrate PROM [facility] [place] for R hip to improve hip mobility. []? Progressing: []? Met: []? Not Met: []? Adjusted   3. Patient will tolerate 2 miles of walking without increased symptoms or restriction. []? Progressing: []? Met: []? Not Met: []? Adjusted     Long Term Goals: To be achieved in: 8 weeks 4/14/21  1. Disability index score of 0% for the AUGUSTIN to assist with reaching prior level of function. []? Progressing: []? Met: []? Not Met: []? Adjusted  2. Patient will demonstrate increased R hip AROM to [facility] [place] and normal LS mobility to allow for proper joint functioning as indicated by patients Functional Deficits.   []? Progressing: []? Met: []? Not Met: []? Adjusted   3. Patient will demonstrate an increase in Strength to at least 4+/5 proximal hip and core activation to allow for proper functional mobility as indicated by patients Functional Deficits. []? Progressing: []? Met: []? Not Met: []? Adjusted   4. Patient will return to walking 4 miles without increased symptoms or restriction. []? Progressing: []? Met: []? Not Met: []? Adjusted  5. Patient will return to hiking without increased symptoms or restriction. []? Progressing: []? Met: []? Not Met: []? Adjusted         Overall Progression Towards Functional goals/ Treatment Progress Update:  [] Patient is progressing as expected towards functional goals listed. [] Progression is slowed due to complexities/Impairments listed. [] Progression has been slowed due to co-morbidities.   [x] Plan just implemented, too soon to assess goals progression <30days   [] Goals require adjustment due to lack of progress  [] Patient is not progressing as expected and requires additional follow up with physician  [] Other    Prognosis for POC: [x] Good [] Fair  [] Poor      Patient requires continued skilled intervention: [x] Yes  [] No    Treatment/Activity Tolerance:  [] Patient able to complete treatment  [] Patient limited by fatigue  [] Patient limited by pain     [] Patient limited by other medical complications  [] Other: Pt told progression of exercise program well. Demostrated good form/control with new exercises but was fatigued. Pt reported increase in core fatigue with VCs to reach further with leg during heel tap position. VCs during clams to keep. Pt demonstrates improved tolerance to prone planks this date, able to hold for nearly 30\", pt does exhibit slight pelvic drop on R, VCs to squeeze glute and pelvic height was normalized. Pt to benefit from PT to address ROM and strength deficits to allow for painfree prolonged walking. Prognosis: [] Good [] Fair  [] Poor    Patient Requires Follow-up: [x] Yes  [] No    PLAN: See eval  [] Continue per plan of care [] Alter current plan (see comments)  [x] Plan of care initiated [] Hold pending MD visit [] Discharge    Electronically signed by: Jodi Daigle PT, DPT, OCS  Physical Therapist  [id number]  [email or web address]    *If patient does not return for further follow ups after this date. Please consider this as the patients discharge from physical therapy.

## 2021-03-05 ENCOUNTER — HOSPITAL ENCOUNTER (OUTPATIENT)
Dept: PHYSICAL THERAPY | Age: 61
Setting detail: THERAPIES SERIES
Discharge: HOME | End: 2021-03-05
Payer: COMMERCIAL

## 2021-03-05 PROCEDURE — 97110 THERAPEUTIC EXERCISES: CPT | Performed by: PHYSICAL THERAPIST

## 2021-03-05 PROCEDURE — 97112 NEUROMUSCULAR REEDUCATION: CPT | Performed by: PHYSICAL THERAPIST

## 2021-03-05 NOTE — FLOWSHEET NOTE
[address] [facility]    Physical Therapy Daily Treatment Note  Date:  3/5/2021    Patient Name:  Chapis Davila    :  1960  MRN: [id number]  Restrictions/Precautions:    Medical/Treatment Diagnosis Information:  Referring Physician: Referring Practitioner: Simone Frenandez NP                                       Evaluation Date: 2021                                       Medical Diagnosis Information:  Diagnosis: Herniated Lumbar Disc (ICD-722.100)(OPL64-G75.26)   Treatment Diagnosis: M54.5, M25.651                                         Insurance information: PT Insurance Information: PT BENEFITS  FACILITY/ [facility]/ EFFECTIVE 2016/ ACTIVE/ DED 2400 .36/ PAYS 80%/ OOP 5900 .36/ NO VISIT LIMIT MED NEC/ 0 AUTH/ ALL CODES BILLABLE/ TELEHEALTH IS NOT COVERED Maurizio STRICKLAND/ FARHEEN REF# [id number]/ 2021 PAG  Has the plan of care been signed (Y/N):        []  Yes  [x]  No     Date of Patient follow up with Physician: 3/8/21 Marcela      Is this a Progress Report:     []  Yes  [x]  No        If Yes:  Date Range for reporting period:  Beginning  Ending    Progress report will be due (10 Rx or 30 days whichever is less): 33      Recertification will be due (POC Duration  / 90 days whichever is less): 21        Visit # Insurance Allowable Auth Required   5 Unlimited per [place] []  Yes []  No        Functional Scale:    Date assessed:  AUGUSTIN 60% disability    21     Latex Allergy:  [x]NO      []YES  Preferred Language for Healthcare:   [x]English       []other:    Pain level:  0/10     SUBJECTIVE:  Walked about 3 hours after PT, similar response that pain began around 1/2 mile to 1 mile, though intensity and frequency of pain seemed \"maybe moderately better. \" P    OBJECTIVE:      Standing Exam Normal Abnormal N/A     Toe walk       x     Heel Walk     x     Side bending x         Pelvic Height x         Fwd Bend- (aberrant juttering or innominate mvmt) x         Extension x         Trendelenburg x         Kemps/Quadrant     x     Stork   x   Minimal pelvic tilt B   SLS/SLS w rotation     x                             Seated Exam Normal Abnormal N/A Comments   Pelvic Height     x     Seated Rotation     x     Seated flexion x         B hip IR     x                             Supine Exam Normal Abnormal N/A Comments   Hip flexion x         Abduction   x   + for tightness   ER   x   + for tightness   IR   x   + for tightness   TAN/Ankush   x   + for tightness  R knee 32cm from table  L knee 18 cm from table   Hip scour     x     SLR x         Crossed SLR x         Supine to sit     x     SI distraction/compression     x     Hip thrust     x                             Prone Exam Normal Abnormal N/A Comments   Prone knee bend     x     Prone hip IR     x     B Achilles reflex/Pheasant     x     PA/Spring     x     Prone Instability test     x     Sacral Spring/thrust     x     Ely's   x   + for tightness B   Posterior chain NM firing       R: glute/HS simultaneous, ES  L: glute, HS, ES      ROM LEFT RIGHT Comments   Lumbar Flex     17 cm using tape measure, C7 to L5   Lumbar Ext     6 cm using tape measure, C7 to L5   [facility]     Rotation     Not assessed                          ROM LEFT RIGHT Comments   Hip Flexion WFL WFL Supine   Hip Abd 29 22 Supine   Hip ER 47 20 Supine 90/90   Hip IR 25 19 Supine 90/90   Hip Extension     Not assessed   Knee Ext     Not assessed   Knee Flex     Not assessed   Hamstring Flex 60 72 SLR                                    Strength LEFT RIGHT Comments   Multifidus     Not assessed   Transverse Ab     Not assessed   Hip Flexors 4+/5 4+/5     Hip Abductors 4/5  4-/5 glute med 4/5  4-/5 glute med     Hip Extensors 4/5 4/5     Quads 4+/5 4+/5     Hamstrings 4+/5 4+/5     Hip IR 4/5 4+/5     Hip ER 4+/5 4/5     Plantarflexion 4+/5 4+/5     Dorsiflexion 4+/5 4+/5        Myotomes Normal Abnormal Comments   Hip flexion (L1-L2) x       Knee extension (L2-L4) x       Dorsiflexion (L4-L5) x       Great Toe Ext (L5)     Not assessed   Ankle Eversion (S1-S2)     Not assessed   Ankle PF(S1-S2) x          Not assessed  Dermatomes Normal Abnormal Comments   inguinal area (L1)          anterior mid-thigh (L2)         distal ant thigh/med knee (L3)         medial lower leg and foot (L4)         lateral lower leg and foot (L5)         posterior calf (S1)         medial calcaneus (S2)               Neural dynamic tension testing Normal Abnormal Comments   Slump Test  - Degree of knee flexion:  x       SLR          0-30 x       30-70 x       Femoral nerve (L2-4) x          Not assessed  Reflexes Normal Abnormal Comments   S1-2 Seated achilles         S1-2 Prone knee bend         L3-4 Patellar tendon         C5-6 Biceps         C6 Brachioradialis         C7-8 Triceps         Clonus         Babinski         Mancia's            Joint mobility:              []?Normal                      [x]? Hypo R hip d/t AAYUSH             []?Hyper     Palpation: None     Functional Mobility/Transfers: Independent with all mobility     Posture: WFL     Gait: (include devices/WB status) WFL      RESTRICTIONS/PRECAUTIONS: none    Exercises/Interventions:   ROM/stretches     Quad 3x30\" R Prone with strap   Figure 4 3x30\" B    Hip IR  Hooklying, L leg providing OP into IR   ITB stretch  Supine   Hamstring 3x30\" R Seated     Standing  HEP             Strengthening     Clamshells 3x10 R only Sidelying  Blue loop above knees   Reverse clamshell 3x10 2# Pillow between knees   Bridges       At half wall for support  HEP   TA brace     Supine Deadbug 3x10 R only One leg at 90/90, heel tap to table     HEP   Sidelying hip/knee flexion 3x10    ABD SLR with ext 3x10    Prone SLR-EOB 3x10         Planks 3x30\" On forearms; 30\" break between reps   Side plank  Knees bent        Blue loop above knees  HEP   Monster walks 2 passes Blue loop above knees  Forward/backward        SL leg press 3x10 # R only             SLS 3x30\" B         Manual      Hip IR stretch  Supine, 90/90               Plan for next session: progress as tolerated; SL leg press, prone SLR EOB      Therapeutic Exercise and NMR EXR  [x] (82476) Provided verbal/tactile cueing for activities related to strengthening, flexibility, endurance, ROM  for improvements in proximal hip and core control with self care, mobility, lifting and ambulation.  [] (21153) Provided verbal/tactile cueing for activities related to improving balance, coordination, kinesthetic sense, posture, motor skill, proprioception  to assist with core control in self care, mobility, lifting, and ambulation. Therapeutic Activities:    [] (70002 or 52685) Provided verbal/tactile cueing for activities related to improving balance, coordination, kinesthetic sense, posture, motor skill, proprioception and motor activation to allow for proper function  with self care and ADLs  [] (08135) Provided training and instruction to the patient for proper core and proximal hip recruitment and positioning with ambulation re-education     Home Exercise Program:          Access Code: [email or web address]   URL: [employer]/   Date: 02/17/2021   Prepared by: Vanessa Banda     2/17/21. Printed handout provided. Updated 2/24/21. Patient has access via mobile lester. Updated 3/5/21.      Exercises   Seated Table Hamstring Stretch - 3 reps - 30 second hold - 1x daily - 7x weekly   Supine Figure 4 Piriformis Stretch - 3 reps - 1 sets - 30 second hold - 1x daily - 7x weekly   Prone Quadriceps Stretch with Strap - 3 reps - 1 sets - 30 second hold - 1x daily - 7x weekly   Standing Quadratus Lumborum Stretch with Doorway - 10 reps - 3 sets - 1x daily - 7x weekly   Clamshell with Resistance - 10 reps - 3 sets - 1x daily - 7x weekly   Supine Bridge - 10 reps - 3 sets - 1x daily - 7x weekly   Supine March - 10 reps - 3 sets - 1x daily - 7x weekly   Sidelying Bent Knee Hip Flexion - 10 reps - 3 sets - 1x daily - 7x weekly   Side Stepping with Resistance at Thighs - 10 reps - 3 sets - 1x daily - 7x weekly   Sidelying Knee Flexion and Extension in Abduction - 10 reps - 3 sets - 1x daily - 7x weekly   Sidelying Hip Abduction - 10 reps - 3 sets - 1x daily - 7x weekly   Squat - 10 reps - 3 sets - 1x daily - 7x weekly   Forward Monster Walks - 10-15 reps - 2-3 sets - 1x daily - 7x weekly   Single Leg Stance - 3 reps - 1 sets - 30 second hold - 1x daily - 7x weekly   Sidelying Reverse Clamshell - 10 reps - 3 sets - 1x daily - 7x weekly        [x] (40901) Reviewed/Progressed HEP activities related to strengthening, flexibility, endurance, ROM of core, proximal hip and LE for functional self-care, mobility, lifting and ambulation   [] (12042) Reviewed/Progressed HEP activities related to improving balance, coordination, kinesthetic sense, posture, motor skill, proprioception of core, proximal hip and LE for self care, mobility, lifting, and ambulation      Manual Treatments:  PROM / STM / Oscillations-Mobs:  G-I, II, III, IV (PA's, Inf., Post.)  [] ([id number]) Provided manual therapy to mobilize proximal hip and LS spine soft tissue/joints for the purpose of modulating pain, promoting relaxation,  increasing ROM, reducing/eliminating soft tissue swelling/inflammation/restriction, improving soft tissue extensibility and allowing for proper ROM for normal function with self care, mobility, lifting and ambulation.      Modalities: none      Charges:  Timed Code Treatment Minutes: 55   Total Treatment Minutes: 55   Time in: 11:32  Time out: 12:27    [] EVAL (LOW) 71402 (typically 20 minutes face-to-face)  [] EVAL (MOD) 88689 (typically 30 minutes face-to-face)  [] EVAL (HIGH) 56504 (typically 45 minutes face-to-face)  [] RE-EVAL     [x] JH(65892) x 2    [] IONTO  [x] NMR (47988) x 2    [] VASO  [] Manual (41355) x      [] Other:  [] TA x      [] Mech Traction (97822)  [] ES(attended) (10364)      [] ES (un) (47317):     Goals:   Patient stated goal: to walk long distances painfree    []? Progressing: []? Met: []? Not Met: []? Adjusted     Therapist goals for Patient:   Short Term Goals: To be achieved in: 4 weeks  3/14/21  1. Independent in HEP and progression per patient tolerance, in order to prevent re-injury. (at 1 week 2/24/21)  []? Progressing: []? Met: []? Not Met: []? Adjusted   2. Patient will have demonstrate PROM [employer] [place] for R hip to improve hip mobility. []? Progressing: []? Met: []? Not Met: []? Adjusted   3. Patient will tolerate 2 miles of walking without increased symptoms or restriction. []? Progressing: []? Met: []? Not Met: []? Adjusted     Long Term Goals: To be achieved in: 8 weeks 4/14/21  1. Disability index score of 0% for the AUGUSTIN to assist with reaching prior level of function. []? Progressing: []? Met: []? Not Met: []? Adjusted  2. Patient will demonstrate increased R hip AROM to [employer] [place] and normal LS mobility to allow for proper joint functioning as indicated by patients Functional Deficits.   []? Progressing: []? Met: []? Not Met: []? Adjusted   3. Patient will demonstrate an increase in Strength to at least 4+/5 proximal hip and core activation to allow for proper functional mobility as indicated by patients Functional Deficits. []? Progressing: []? Met: []? Not Met: []? Adjusted   4. Patient will return to walking 4 miles without increased symptoms or restriction. []? Progressing: []? Met: []? Not Met: []? Adjusted  5. Patient will return to hiking without increased symptoms or restriction. []? Progressing: []? Met: []? Not Met: []? Adjusted         Overall Progression Towards Functional goals/ Treatment Progress Update:  [] Patient is progressing as expected towards functional goals listed. [] Progression is slowed due to complexities/Impairments listed. [] Progression has been slowed due to co-morbidities.   [x] Plan just implemented, too soon to assess goals progression <30days   [] Goals require adjustment due to lack of progress  [] Patient is not progressing as expected and requires additional follow up with physician  [] Other    Prognosis for POC: [x] Good [] Fair  [] Poor      Patient requires continued skilled intervention: [x] Yes  [] No    Treatment/Activity Tolerance:  [] Patient able to complete treatment  [] Patient limited by fatigue  [] Patient limited by pain     [] Patient limited by other medical complications  [] Other: Pt exhibits poor SLS, only able to maintain balance for 5-10\" on either side before requiring UE support on half wall to regain control, + use of hip and ankle strategies to maintain balance, mild knee valgus. Pt continues to be challenged by sidelying hip flexion, reported glute fatigue, demonstrates good form and only has occasional knee drop below hip level. Pt noted that reverse clams were easy. Dead bugs are getting easier, discussed appropriate exercise progressions. Pt to benefit from PT to address ROM and strength deficits to allow for painfree prolonged walking. Prognosis: [] Good [] Fair  [] Poor    Patient Requires Follow-up: [x] Yes  [] No    PLAN: See eval  [] Continue per plan of care [] Alter current plan (see comments)  [x] Plan of care initiated [] Hold pending MD visit [] Discharge    Electronically signed by: Melvi Theodore PT, DPT, OCS  Physical Therapist  [id number]  [email or web address]    *If patient does not return for further follow ups after this date. Please consider this as the patients discharge from physical therapy.

## 2021-03-09 ENCOUNTER — HOSPITAL ENCOUNTER (OUTPATIENT)
Dept: PHYSICAL THERAPY | Age: 61
Setting detail: THERAPIES SERIES
Discharge: HOME | End: 2021-03-09
Payer: COMMERCIAL

## 2021-03-09 PROCEDURE — 97112 NEUROMUSCULAR REEDUCATION: CPT | Performed by: PHYSICAL THERAPIST

## 2021-03-09 PROCEDURE — 97110 THERAPEUTIC EXERCISES: CPT | Performed by: PHYSICAL THERAPIST

## 2021-03-09 NOTE — FLOWSHEET NOTE
[address] [facility]    Physical Therapy Daily Treatment Note  Date:  3/9/2021    Patient Name:  Naya Keller    :  1960  MRN: [id number]  Restrictions/Precautions:    Medical/Treatment Diagnosis Information:  Referring Physician: Referring Practitioner: Evelia Bowen NP                                       Evaluation Date: 2021                                       Medical Diagnosis Information:  Diagnosis: Herniated Lumbar Disc (ICD-722.100)(ZJM82-S24.26)   Treatment Diagnosis: M54.5, M25.651                                         Insurance information: PT Insurance Information: PT BENEFITS  FACILITY/ [facility]/ EFFECTIVE 2016/ ACTIVE/ DED 2400 .36/ PAYS 80%/ OOP 5900 .36/ NO VISIT LIMIT MED NEC/ 0 AUTH/ ALL CODES BILLABLE/ TELEHEALTH IS NOT COVERED UNLES COVID RELATED/ [facility]EN REF# [id number]/ 2021 PAG  Has the plan of care been signed (Y/N):        []  Yes  [x]  No     Date of Patient follow up with Physician: 3/8/21 Marcela      Is this a Progress Report:     []  Yes  [x]  No        If Yes:  Date Range for reporting period:  Beginning  Ending    Progress report will be due (10 Rx or 30 days whichever is less):       Recertification will be due (POC Duration  / 90 days whichever is less): 21        Visit # Insurance Allowable Auth Required   6 Unlimited per [place] []  Yes []  No        Functional Scale:    Date assessed:  AUGUSTIN 60% disability    21     Latex Allergy:  [x]NO      []YES  Preferred Language for Healthcare:   [x]English       []other:    Pain level:  0/10     SUBJECTIVE:  Pt states that he has been having more pain than normal with walking the last few days, Saturday and  were a bit worse than normal. Feels more like a strain than pain, from low back down to calf. Saw MD yesterday, recommending another epidural (needs to be scheduled), continue with PT and f/u in 1 month.  Pt states that he is discouraged that pain has been present the last 3 days as he was feeling like he was making good progress on walks on non-PT days.      OBJECTIVE:      Standing Exam Normal Abnormal N/A     Toe walk       x     Heel Walk     x     Side bending x         Pelvic Height x         Fwd Bend- (aberrant juttering or innominate mvmt) x         Extension x         Trendelenburg x         Kemps/Quadrant     x     Stork   x   Minimal pelvic tilt B   SLS/SLS w rotation     x                             Seated Exam Normal Abnormal N/A Comments   Pelvic Height     x     Seated Rotation     x     Seated flexion x         B hip IR     x                             Supine Exam Normal Abnormal N/A Comments   Hip flexion x         Abduction   x   + for tightness   ER   x   + for tightness   IR   x   + for tightness   TAN/Ankush   x   + for tightness  R knee 32cm from table  L knee 18 cm from table   Hip scour     x     SLR x         Crossed SLR x         Supine to sit     x     SI distraction/compression     x     Hip thrust     x                             Prone Exam Normal Abnormal N/A Comments   Prone knee bend     x     Prone hip IR     x     B Achilles reflex/Pheasant     x     PA/Spring     x     Prone Instability test     x     Sacral Spring/thrust     x     Ely's   x   + for tightness B   Posterior chain NM firing       R: glute/HS simultaneous, ES  L: glute, HS, ES      ROM LEFT RIGHT Comments   Lumbar Flex     17 cm using tape measure, C7 to L5   Lumbar Ext     6 cm using tape measure, C7 to L5   [facility]     Rotation     Not assessed                          ROM LEFT RIGHT Comments   Hip Flexion WFL WFL Supine   Hip Abd 29 22 Supine   Hip ER 47 20 Supine 90/90   Hip IR 25 19 Supine 90/90   Hip Extension     Not assessed   Knee Ext     Not assessed   Knee Flex     Not assessed   Hamstring Flex 60 72 SLR                                    Strength LEFT RIGHT Comments   Multifidus     Not assessed   Transverse Ab     Not assessed   Hip Flexors 4+/5 4+/5     Hip Abductors 4/5  4-/5 glute med 4/5  4-/5 glute med     Hip Extensors 4/5 4/5     Quads 4+/5 4+/5     Hamstrings 4+/5 4+/5     Hip IR 4/5 4+/5     Hip ER 4+/5 4/5     Plantarflexion 4+/5 4+/5     Dorsiflexion 4+/5 4+/5        Myotomes Normal Abnormal Comments   Hip flexion (L1-L2) x       Knee extension (L2-L4) x       Dorsiflexion (L4-L5) x       Great Toe Ext (L5)     Not assessed   Ankle Eversion (S1-S2)     Not assessed   Ankle PF(S1-S2) x          Not assessed  Dermatomes Normal Abnormal Comments   inguinal area (L1)          anterior mid-thigh (L2)         distal ant thigh/med knee (L3)         medial lower leg and foot (L4)         lateral lower leg and foot (L5)         posterior calf (S1)         medial calcaneus (S2)               Neural dynamic tension testing Normal Abnormal Comments   Slump Test  - Degree of knee flexion:  x       SLR          0-30 x       30-70 x       Femoral nerve (L2-4) x          Not assessed  Reflexes Normal Abnormal Comments   S1-2 Seated achilles         S1-2 Prone knee bend         L3-4 Patellar tendon         C5-6 Biceps         C6 Brachioradialis         C7-8 Triceps         Clonus         Babinski         Mancia's            Joint mobility:              []?Normal                      [x]? Hypo R hip d/t AAYUSH             []?Hyper     Palpation: None     Functional Mobility/Transfers: Independent with all mobility     Posture: WFL     Gait: (include devices/WB status) WFL      RESTRICTIONS/PRECAUTIONS: none    Exercises/Interventions:   ROM/stretches     Quad 3x30\" R Prone with strap   Figure 4 3x30\" B    Hip IR  Hooklying, L leg providing OP into IR   ITB stretch  Supine   Hamstring 3x30\" R Seated     Standing  HEP   Sciatic nerve glide x10         Strengthening     Clamshells  Sidelying  Blue loop above knees   Reverse clamshell 3x10 4# Pillow between knees   Bridges       At half wall for support  HEP   TA brace     Supine Deadbug Stretch with Strap - 3 reps - 1 sets - 30 second hold - 1x daily - 7x weekly   Standing Quadratus Lumborum Stretch with Doorway - 10 reps - 3 sets - 1x daily - 7x weekly   Clamshell with Resistance - 10 reps - 3 sets - 1x daily - 7x weekly   Supine Bridge - 10 reps - 3 sets - 1x daily - 7x weekly   Supine March - 10 reps - 3 sets - 1x daily - 7x weekly   Sidelying Bent Knee Hip Flexion - 10 reps - 3 sets - 1x daily - 7x weekly   Side Stepping with Resistance at Thighs - 10 reps - 3 sets - 1x daily - 7x weekly   Sidelying Knee Flexion and Extension in Abduction - 10 reps - 3 sets - 1x daily - 7x weekly   Sidelying Hip Abduction - 10 reps - 3 sets - 1x daily - 7x weekly   Squat - 10 reps - 3 sets - 1x daily - 7x weekly   Forward Monster Walks - 10-15 reps - 2-3 sets - 1x daily - 7x weekly   Single Leg Stance - 3 reps - 1 sets - 30 second hold - 1x daily - 7x weekly   Sidelying Reverse Clamshell - 10 reps - 3 sets - 1x daily - 7x weekly        [x] (98487) Reviewed/Progressed HEP activities related to strengthening, flexibility, endurance, ROM of core, proximal hip and LE for functional self-care, mobility, lifting and ambulation   [] (05806) Reviewed/Progressed HEP activities related to improving balance, coordination, kinesthetic sense, posture, motor skill, proprioception of core, proximal hip and LE for self care, mobility, lifting, and ambulation      Manual Treatments:  PROM / STM / Oscillations-Mobs:  G-I, II, III, IV (PA's, Inf., Post.)  [] ([id number]) Provided manual therapy to mobilize proximal hip and LS spine soft tissue/joints for the purpose of modulating pain, promoting relaxation,  increasing ROM, reducing/eliminating soft tissue swelling/inflammation/restriction, improving soft tissue extensibility and allowing for proper ROM for normal function with self care, mobility, lifting and ambulation.      Modalities: none      Charges:  Timed Code Treatment Minutes: 52   Total Treatment Minutes: 52   Time in: Progressing: []? Met: []? Not Met: []? Adjusted         Overall Progression Towards Functional goals/ Treatment Progress Update:  [] Patient is progressing as expected towards functional goals listed. [] Progression is slowed due to complexities/Impairments listed. [] Progression has been slowed due to co-morbidities. [x] Plan just implemented, too soon to assess goals progression <30days   [] Goals require adjustment due to lack of progress  [] Patient is not progressing as expected and requires additional follow up with physician  [] Other    Prognosis for POC: [x] Good [] Fair  [] Poor      Patient requires continued skilled intervention: [x] Yes  [] No    Treatment/Activity Tolerance:  [] Patient able to complete treatment  [] Patient limited by fatigue  [] Patient limited by pain     [] Patient limited by other medical complications  [] Other: Pt continues to be challenged by exercise program. Fatigued with addition of wall sits, + valgus of R knee by end of 1st rep, improved with VCs to keep knees pushed into band. Continues to require UE support on half wall to regain balance/control during SLS. Pt challenged by side planks, + anterior trunk rotation that was more prominent on L side, noted some popping in L shoulder- discussed that it could have been form as this did not occur on the R. Advised pt to perform nerve glides perform he goes a walk. Pt to benefit from PT to address ROM and strength deficits to allow for painfree prolonged walking. Prognosis: [] Good [] Fair  [] Poor    Patient Requires Follow-up: [x] Yes  [] No    PLAN: See eval  [x] Continue per plan of care [] Alter current plan (see comments)  [] Plan of care initiated [] Hold pending MD visit [] Discharge    Electronically signed by: Akanksha Beckett PT, DPT, OCS  Physical Therapist  [id number]  [email or web address]    *If patient does not return for further follow ups after this date.  Please consider this as the patients discharge from physical therapy.

## 2021-03-12 ENCOUNTER — HOSPITAL ENCOUNTER (OUTPATIENT)
Dept: PHYSICAL THERAPY | Age: 61
Setting detail: THERAPIES SERIES
Discharge: HOME | End: 2021-03-12
Payer: COMMERCIAL

## 2021-03-12 PROCEDURE — 97112 NEUROMUSCULAR REEDUCATION: CPT | Performed by: PHYSICAL THERAPIST

## 2021-03-12 PROCEDURE — 97110 THERAPEUTIC EXERCISES: CPT | Performed by: PHYSICAL THERAPIST

## 2021-03-12 NOTE — FLOWSHEET NOTE
[address] [facility]    Physical Therapy Daily Treatment Note  Date:  3/12/2021    Patient Name:  Naya Keller    :  1960  MRN: [id number]  Restrictions/Precautions:    Medical/Treatment Diagnosis Information:  Referring Physician: Referring Practitioner: Evelia Bowen NP                                       Evaluation Date: 2021                                       Medical Diagnosis Information:  Diagnosis: Herniated Lumbar Disc (ICD-722.100)(SYF91-O23.26)   Treatment Diagnosis: M54.5, M25.651                                         Insurance information: PT Insurance Information: PT BENEFITS  FACILITY/ [facility]/ EFFECTIVE 2016/ ACTIVE/ DED 2400 .36/ PAYS 80%/ OOP 5900 .36/ NO VISIT LIMIT MED NEC/ 0 AUTH/ ALL CODES BILLABLE/ TELEHEALTH IS NOT COVERED Toshia Choe RELATED/ FARHEEN REF# [id number]/ 2021 PAG  Has the plan of care been signed (Y/N):        []  Yes  [x]  No     Date of Patient follow up with Physician: 3/8/21 Marcela      Is this a Progress Report:     []  Yes  [x]  No        If Yes:  Date Range for reporting period:  Beginning  Ending    Progress report will be due (10 Rx or 30 days whichever is less):       Recertification will be due (POC Duration  / 90 days whichever is less): 21        Visit # Insurance Allowable Auth Required   7 Unlimited per [place] []  Yes []  No        Functional Scale:    Date assessed:  AU[place]USTIN 60% disability    21     Latex Allergy:  [x]NO      []YES  Preferred Language for Healthcare:   [x]English       []other:    Pain level:  0/10     SUBJECTIVE:  Pt states that doing nerve glide has been helpful. He went for a 4 mile walk right after PT on Tuesday and it went much better than he was expecting and he did not have nearly the pain he usually does when he walks following PT.  Pt states that his walked on Wed and Thurs were some of his best. He already walked this morning and he felt pretty good, some pain. Scheduled for epidural 4/7.       OBJECTIVE:      Standing Exam Normal Abnormal N/A     Toe walk       x     Heel Walk     x     Side bending x         Pelvic Height x         Fwd Bend- (aberrant juttering or innominate mvmt) x         Extension x         Trendelenburg x         Kemps/Quadrant     x     Stork   x   Minimal pelvic tilt B   SLS/SLS w rotation     x                             Seated Exam Normal Abnormal N/A Comments   Pelvic Height     x     Seated Rotation     x     Seated flexion x         B hip IR     x                             Supine Exam Normal Abnormal N/A Comments   Hip flexion x         Abduction   x   + for tightness   ER   x   + for tightness   IR   x   + for tightness   TAN/Ankush   x   + for tightness  R knee 32cm from table  L knee 18 cm from table   Hip scour     x     SLR x         Crossed SLR x         Supine to sit     x     SI distraction/compression     x     Hip thrust     x                             Prone Exam Normal Abnormal N/A Comments   Prone knee bend     x     Prone hip IR     x     B Achilles reflex/Pheasant     x     PA/Spring     x     Prone Instability test     x     Sacral Spring/thrust     x     Ely's   x   + for tightness B   Posterior chain NM firing       R: glute/HS simultaneous, ES  L: glute, HS, ES      ROM LEFT RIGHT Comments   Lumbar Flex     17 cm using tape measure, C7 to L5   Lumbar Ext     6 cm using tape measure, C7 to L5   Side Bend [place] [place]     Rotation     Not assessed                          ROM LEFT RIGHT Comments   Hip Flexion WFL WFL Supine   Hip Abd 29 22 Supine   Hip ER 47 20 Supine 90/90   Hip IR 25 19 Supine 90/90   Hip Extension     Not assessed   Knee Ext     Not assessed   Knee Flex     Not assessed   Hamstring Flex 60 72 SLR                                    Strength LEFT RIGHT Comments   Multifidus     Not assessed   Transverse Ab     Not assessed   Hip Flexors 4+/5 4+/5     Hip Abductors 4/5  4-/5 glute med 4/5  4-/5 glute med     Hip Extensors 4/5 4/5     Quads 4+/5 4+/5     Hamstrings 4+/5 4+/5     Hip IR 4/5 4+/5     Hip ER 4+/5 4/5     Plantarflexion 4+/5 4+/5     Dorsiflexion 4+/5 4+/5        Myotomes Normal Abnormal Comments   Hip flexion (L1-L2) x       Knee extension (L2-L4) x       Dorsiflexion (L4-L5) x       Great Toe Ext (L5)     Not assessed   Ankle Eversion (S1-S2)     Not assessed   Ankle PF(S1-S2) x          Not assessed  Dermatomes Normal Abnormal Comments   inguinal area (L1)          anterior mid-thigh (L2)         distal ant thigh/med knee (L3)         medial lower leg and foot (L4)         lateral lower leg and foot (L5)         posterior calf (S1)         medial calcaneus (S2)               Neural dynamic tension testing Normal Abnormal Comments   Slump Test  - Degree of knee flexion:  x       SLR          0-30 x       30-70 x       Femoral nerve (L2-4) x          Not assessed  Reflexes Normal Abnormal Comments   S1-2 Seated achilles         S1-2 Prone knee bend         L3-4 Patellar tendon         C5-6 Biceps         C6 Brachioradialis         C7-8 Triceps         Clonus         Babinski         Mancia's            Joint mobility:              []?Normal                      [x]? Hypo R hip d/t AAYUSH             []?Hyper     Palpation: None     Functional Mobility/Transfers: Independent with all mobility     Posture: WFL     Gait: (include devices/WB status) WFL      RESTRICTIONS/PRECAUTIONS: none    Exercises/Interventions:   ROM/stretches     Quad 3x30\" R Prone with strap   Figure 4 3x30\" B    Hip IR  Hooklying, L leg providing OP into IR   ITB stretch  Supine   Hamstring 3x30\" R Seated     Standing  HEP   Sciatic nerve glide          Strengthening     Clamshells  Sidelying  Blue loop above knees   Reverse clamshell 3x10 6# Pillow between knees   Bridges       At half wall for support  HEP   TA brace     Supine Deadbug  One leg at 90/90, heel tap to table     HEP   Sidelying hip/knee flexion 3x10    ABD SLR with ext     Prone SLR-EOB 3x10 3#    SL squat 3x10 To treatment table, LLE on slider        Planks  On forearms; 30\" break between reps   Side plank 2xfatigue R only Popping in L shoulder when on L side, hold at this time    R: 72\"/63\"        Blue loop above knees  HEP   Monster walks 2 passes Blue loop above knees  Forward/backward   Wall sits 3xfatigue Blue loop above knees  86\"/78\"/86\"        SL leg press R only           SLS    RB 2x30\" A/P  2x30\"         Manual      Hip IR stretch  Supine, 90/90               Plan for next session: progress as tolerated; focus on glute and core strength/endurance    Therapeutic Exercise and NMR EXR  [x] (74456) Provided verbal/tactile cueing for activities related to strengthening, flexibility, endurance, ROM  for improvements in proximal hip and core control with self care, mobility, lifting and ambulation.  [] (64536) Provided verbal/tactile cueing for activities related to improving balance, coordination, kinesthetic sense, posture, motor skill, proprioception  to assist with core control in self care, mobility, lifting, and ambulation. Therapeutic Activities:    [] (90790 or 40648) Provided verbal/tactile cueing for activities related to improving balance, coordination, kinesthetic sense, posture, motor skill, proprioception and motor activation to allow for proper function  with self care and ADLs  [] (04437) Provided training and instruction to the patient for proper core and proximal hip recruitment and positioning with ambulation re-education     Home Exercise Program:  Access Code: [email or web address]   URL: [email or web address]   Date: 02/17/2021   Prepared by: Leon Solis     2/17/21. Printed handout provided. Updated 2/24/21. Patient has access via mobile lester. Updated 3/5/21.      Exercises   Seated Table Hamstring Stretch - 3 reps - 30 second hold - 1x daily - 7x weekly   Supine Figure 4 Piriformis Stretch - 3 reps - 1 sets - 30 second hold - 1x daily - 7x weekly   Prone Quadriceps Stretch with Strap - 3 reps - 1 sets - 30 second hold - 1x daily - 7x weekly   Standing Quadratus Lumborum Stretch with Doorway - 10 reps - 3 sets - 1x daily - 7x weekly   Clamshell with Resistance - 10 reps - 3 sets - 1x daily - 7x weekly   Supine Bridge - 10 reps - 3 sets - 1x daily - 7x weekly   Supine March - 10 reps - 3 sets - 1x daily - 7x weekly   Sidelying Bent Knee Hip Flexion - 10 reps - 3 sets - 1x daily - 7x weekly   Side Stepping with Resistance at Thighs - 10 reps - 3 sets - 1x daily - 7x weekly   Sidelying Knee Flexion and Extension in Abduction - 10 reps - 3 sets - 1x daily - 7x weekly   Sidelying Hip Abduction - 10 reps - 3 sets - 1x daily - 7x weekly   Squat - 10 reps - 3 sets - 1x daily - 7x weekly   Forward Monster Walks - 10-15 reps - 2-3 sets - 1x daily - 7x weekly   Single Leg Stance - 3 reps - 1 sets - 30 second hold - 1x daily - 7x weekly   Sidelying Reverse Clamshell - 10 reps - 3 sets - 1x daily - 7x weekly        [x] (96983) Reviewed/Progressed HEP activities related to strengthening, flexibility, endurance, ROM of core, proximal hip and LE for functional self-care, mobility, lifting and ambulation   [] (30185) Reviewed/Progressed HEP activities related to improving balance, coordination, kinesthetic sense, posture, motor skill, proprioception of core, proximal hip and LE for self care, mobility, lifting, and ambulation      Manual Treatments:  PROM / STM / Oscillations-Mobs:  G-I, II, III, IV (PA's, Inf., Post.)  [] ([id number]) Provided manual therapy to mobilize proximal hip and LS spine soft tissue/joints for the purpose of modulating pain, promoting relaxation,  increasing ROM, reducing/eliminating soft tissue swelling/inflammation/restriction, improving soft tissue extensibility and allowing for proper ROM for normal function with self care, mobility, lifting and ambulation.      Modalities: none      Charges:  Timed Code Treatment Minutes: 50   Total symptoms or restriction. []? Progressing: []? Met: []? Not Met: []? Adjusted         Overall Progression Towards Functional goals/ Treatment Progress Update:  [] Patient is progressing as expected towards functional goals listed. [] Progression is slowed due to complexities/Impairments listed. [] Progression has been slowed due to co-morbidities. [x] Plan just implemented, too soon to assess goals progression <30days   [] Goals require adjustment due to lack of progress  [] Patient is not progressing as expected and requires additional follow up with physician  [] Other    Prognosis for POC: [x] Good [] Fair  [] Poor      Patient requires continued skilled intervention: [x] Yes  [] No    Treatment/Activity Tolerance:  [] Patient able to complete treatment  [] Patient limited by fatigue  [] Patient limited by pain     [] Patient limited by other medical complications  [] Other: Only performed side planks on R d/t popping in L shoulder when in L side plank. Pt challenged by SL squat to treatment table, required use of slider on LLE d/t poor SL balance. Pt did exhibit knee valgus and required cueing to keep knee in line with hip and ankle. Improved tolerance to wall sits, able to hold each rep longer the LPV, does still exhibit R knee valgus as fatigue sets in. Pt challenged by RB balance M/L > A/P, + knee valgus B with M/L. Pt to benefit from PT to address ROM and strength deficits to allow for painfree prolonged walking. Prognosis: [] Good [] Fair  [] Poor    Patient Requires Follow-up: [x] Yes  [] No    PLAN: See eval  [x] Continue per plan of care [] Alter current plan (see comments)  [] Plan of care initiated [] Hold pending MD visit [] Discharge    Electronically signed by: Caren Ferguson PT, DPT, OCS  Physical Therapist  [id number]  [email or web address]    *If patient does not return for further follow ups after this date. Please consider this as the patients discharge from physical therapy.

## 2021-03-16 ENCOUNTER — HOSPITAL ENCOUNTER (OUTPATIENT)
Dept: PHYSICAL THERAPY | Age: 61
Setting detail: THERAPIES SERIES
Discharge: HOME | End: 2021-03-16
Payer: COMMERCIAL

## 2021-03-16 PROCEDURE — 97530 THERAPEUTIC ACTIVITIES: CPT | Performed by: PHYSICAL THERAPIST

## 2021-03-16 PROCEDURE — 97110 THERAPEUTIC EXERCISES: CPT | Performed by: PHYSICAL THERAPIST

## 2021-03-23 ENCOUNTER — HOSPITAL ENCOUNTER (OUTPATIENT)
Dept: PHYSICAL THERAPY | Age: 61
Setting detail: THERAPIES SERIES
Discharge: HOME | End: 2021-03-23
Payer: COMMERCIAL

## 2021-03-23 PROCEDURE — 97110 THERAPEUTIC EXERCISES: CPT | Performed by: PHYSICAL THERAPIST

## 2021-03-23 NOTE — FLOWSHEET NOTE
[address] [facility]    Physical Therapy Daily Treatment Note  Date:  3/23/2021    Patient Name:  Tomas Caballero    :  1960  MRN: [id number]  Restrictions/Precautions:    Medical/Treatment Diagnosis Information:  Referring Physician: Referring Practitioner: Daniel Whitten NP                                       Evaluation Date: 2021                                       Medical Diagnosis Information:  Diagnosis: Herniated Lumbar Disc (ICD-722.100)(QOY28-N32.26)   Treatment Diagnosis: M54.5, M25.651                                         Insurance information: PT Insurance Information: PT BENEFITS  FACILITY/ [facility]/ EFFECTIVE 2016/ ACTIVE/ DED 2400 .36/ PAYS 80%/ OOP 5900 .36/ NO VISIT LIMIT MED NEC/ 0 AUTH/ ALL CODES BILLABLE/ TELEHEALTH IS NOT COVERED Simón Morris RELATED/ FARHEEN REF# [id number]/ 2021 PAG  Has the plan of care been signed (Y/N):        []  Yes  [x]  No     Date of Patient follow up with Physician: 3/8/21 Marcela      Is this a Progress Report:     []  Yes  [x]  No        If Yes:  Date Range for reporting period:  Beginning  Ending    Progress report will be due (10 Rx or 30 days whichever is less):       Recertification will be due (POC Duration  / 90 days whichever is less): 21        Visit # Insurance Allowable Auth Required   9 Unlimited per [place] []  Yes []  No        Functional Scale:    Date assessed:  AUGUSTIN 60% disability    21     Latex Allergy:  [x]NO      []YES  Preferred Language for Healthcare:   [x]English       []other:     Pain level:  0/10     SUBJECTIVE:  Pt states that his leg pain persists. Tightness, \"locking up\" in glute and calf. Thursday/Friday Corita Neighbor and often\" but Saturday/ no pain, yesterday \"early and often. \" Thursday and Friday his pain was similar to what he had before he started PT.  Pt is frustrated that his symptoms are so variable even though his day to day routine does not change much.     OBJECTIVE:      Standing Exam Normal Abnormal N/A     Toe walk       x     Heel Walk     x     Side bending x         Pelvic Height x         Fwd Bend- (aberrant juttering or innominate mvmt) x         Extension x         Trendelenburg x         Kemps/Quadrant     x     Stork   x   Minimal pelvic tilt B   SLS/SLS w rotation     x                             Seated Exam Normal Abnormal N/A Comments   Pelvic Height     x     Seated Rotation     x     Seated flexion x         B hip IR    x  Limited on R d/t AAYUSH                           Supine Exam Normal Abnormal N/A Comments   Hip flexion x         Abduction   x   + for tightness   ER   x   + for tightness   IR   x   + for tightness   TAN/Ankush   x   + for tightness  R knee 30cm from table  L knee 18 cm from table   Hip scour     x     SLR x         Crossed SLR x         Supine to sit     x     SI distraction/compression     x     Hip thrust     x                             Prone Exam Normal Abnormal N/A Comments   Prone knee bend     x     Prone hip IR     x     B Achilles reflex/Pheasant     x     PA/Spring     x     Prone Instability test     x     Sacral Spring/thrust     x     Ely's   x   + for tightness B   Posterior chain NM firing       R: glute/HS simultaneous, ES  L: glute, HS, ES      ROM LEFT RIGHT Comments   Lumbar Flex     12 cm using tape measure, C7 to L5   Lumbar Ext     6 cm using tape measure, C7 to L5   [place]     Rotation     Not assessed                          ROM LEFT RIGHT Comments   Hip Flexion WFL WFL Supine   Hip Abd 29 22 Supine   Hip ER 47 20 Supine 90/90   Hip IR 25 19 Supine 90/90   Hip Extension     Not assessed   Knee Ext     Not assessed   Knee Flex     Not assessed   Hamstring Flex 60 72 SLR                                    Strength LEFT RIGHT Comments   Multifidus     Not assessed   Transverse Ab     Not assessed   Hip Flexors 4+/5 4+/5     Hip Abductors 4/5  4/5 glute med 4-/5  4-/5 glute med     Hip Extensors 4/5 4/5     Quads 4+/5 4+/5     Hamstrings 4+/5 4+/5     Hip IR 4/5 4+/5     Hip ER 4+/5 4/5     Plantarflexion 4+/5 4+/5     Dorsiflexion 4+/5 4+/5        Myotomes Normal Abnormal Comments   Hip flexion (L1-L2) x       Knee extension (L2-L4) x       Dorsiflexion (L4-L5) x       Great Toe Ext (L5)     Not assessed   Ankle Eversion (S1-S2)     Not assessed   Ankle PF(S1-S2) x          Not assessed  Dermatomes Normal Abnormal Comments   inguinal area (L1)          anterior mid-thigh (L2)         distal ant thigh/med knee (L3)         medial lower leg and foot (L4)         lateral lower leg and foot (L5)         posterior calf (S1)         medial calcaneus (S2)               Neural dynamic tension testing Normal Abnormal Comments   Slump Test  - Degree of knee flexion:  x       SLR          0-30 x       30-70 x       Femoral nerve (L2-4) x          Not assessed  Reflexes Normal Abnormal Comments   S1-2 Seated achilles         S1-2 Prone knee bend         L3-4 Patellar tendon         C5-6 Biceps         C6 Brachioradialis         C7-8 Triceps         Clonus         Babinski         Mancia's            Joint mobility:              []?Normal                      [x]? Hypo R hip d/t AAYUSH             []?Hyper     Palpation: None     Functional Mobility/Transfers: Independent with all mobility     Posture: WFL     Gait: (include devices/WB status) WFL      RESTRICTIONS/PRECAUTIONS: none    Exercises/Interventions:   ROM/stretches     Quad Prone with strap   Figure 4    Hip IR Hooklying, L leg providing OP into IR   ITB stretch Supine, using LLE for OP   Hamstring Seated     Standing  HEP   Sciatic nerve glide          Strengthening     Clamshells 3x10 R only, neutral hip Sidelying  Blue loop above knees   Reverse clamshell 3x12 6# Pillow between knees   Bridges 10x10\" feet elevated      At half wall for support  HEP   TA brace     Supine Deadbug  One leg at 90/90, heel tap to table HEP   Sidelying hip/knee flexion 3x10    ABD SLR with ext     Prone SLR-EOB     sidelying ABD circles 3x10 CW/CCW    Prone hip ER/IR    SL squat To treatment table, LLE on slider  Green band around R knee for tactile cue to ER leg        Planks  On forearms; 30\" break between reps   Side plank  Popping in L shoulder when on L side, hold at this time    R: 72\"/63\"   Superman Trunk ext only   Bear plank         Blue loop above knees  HEP   Monster walks Blue loop above knees  Forward/backward   Standing hip ER Feet hip width apart, let RLE fall into valgus, push out against band   Wall sits Blue loop above knees  86\"/78\"/86\"   Lateral step up Focus on glute squeeze   SL leg press R only           SLS    RB          Manual      Hip IR stretch  Supine, 90/90               Plan for next session: progress as tolerated; focus on glute and core strength/endurance    Therapeutic Exercise and NMR EXR  [x] (66432) Provided verbal/tactile cueing for activities related to strengthening, flexibility, endurance, ROM  for improvements in proximal hip and core control with self care, mobility, lifting and ambulation.  [] (50503) Provided verbal/tactile cueing for activities related to improving balance, coordination, kinesthetic sense, posture, motor skill, proprioception  to assist with core control in self care, mobility, lifting, and ambulation. Therapeutic Activities:    [] (04869 or 73132) Provided verbal/tactile cueing for activities related to improving balance, coordination, kinesthetic sense, posture, motor skill, proprioception and motor activation to allow for proper function  with self care and ADLs  [] (94426) Provided training and instruction to the patient for proper core and proximal hip recruitment and positioning with ambulation re-education     Home Exercise Program:  Access Code: [email or web address]   URL: [email or web address]   Date: 02/17/2021   Prepared by: Vanessa Banda     2/17/21.  Printed handout provided. Updated 2/24/21. Patient has access via mobile lester. Updated 3/5/21.      Exercises   Seated Table Hamstring Stretch - 3 reps - 30 second hold - 1x daily - 7x weekly   Supine Figure 4 Piriformis Stretch - 3 reps - 1 sets - 30 second hold - 1x daily - 7x weekly   Prone Quadriceps Stretch with Strap - 3 reps - 1 sets - 30 second hold - 1x daily - 7x weekly   Standing Quadratus Lumborum Stretch with Doorway - 10 reps - 3 sets - 1x daily - 7x weekly   Clamshell with Resistance - 10 reps - 3 sets - 1x daily - 7x weekly   Supine Bridge - 10 reps - 3 sets - 1x daily - 7x weekly   Supine March - 10 reps - 3 sets - 1x daily - 7x weekly   Sidelying Bent Knee Hip Flexion - 10 reps - 3 sets - 1x daily - 7x weekly   Side Stepping with Resistance at Thighs - 10 reps - 3 sets - 1x daily - 7x weekly   Sidelying Knee Flexion and Extension in Abduction - 10 reps - 3 sets - 1x daily - 7x weekly   Sidelying Hip Abduction - 10 reps - 3 sets - 1x daily - 7x weekly   Squat - 10 reps - 3 sets - 1x daily - 7x weekly   Forward Monster Walks - 10-15 reps - 2-3 sets - 1x daily - 7x weekly   Single Leg Stance - 3 reps - 1 sets - 30 second hold - 1x daily - 7x weekly   Sidelying Reverse Clamshell - 10 reps - 3 sets - 1x daily - 7x weekly        [x] (79963) Reviewed/Progressed HEP activities related to strengthening, flexibility, endurance, ROM of core, proximal hip and LE for functional self-care, mobility, lifting and ambulation   [] (46496) Reviewed/Progressed HEP activities related to improving balance, coordination, kinesthetic sense, posture, motor skill, proprioception of core, proximal hip and LE for self care, mobility, lifting, and ambulation      Manual Treatments:  PROM / STM / Oscillations-Mobs:  G-I, II, III, IV (PA's, Inf., Post.)  [] ([id number]) Provided manual therapy to mobilize proximal hip and LS spine soft tissue/joints for the purpose of modulating pain, promoting relaxation,  increasing ROM, reducing/eliminating soft tissue swelling/inflammation/restriction, improving soft tissue extensibility and allowing for proper ROM for normal function with self care, mobility, lifting and ambulation. Modalities: none      Charges:  Timed Code Treatment Minutes: 38   Total Treatment Minutes: 38   Time in: 9:35  Time out: 10:21    [] EVAL (LOW) 63621 (typically 20 minutes face-to-face)  [] EVAL (MOD) 98729 (typically 30 minutes face-to-face)  [] EVAL (HIGH) 91495 (typically 45 minutes face-to-face)  [] RE-EVAL     [x] OH(62138) x 3    [] IONTO  [] NMR (36639) x     [] VASO  [] Manual (53835) x      [] Other:  [] TA x      [] Mech Traction (36917)  [] ES(attended) (43583)      [] ES (un) (36737):     Goals:   Patient stated goal: to walk long distances painfree    []? Progressing: []? Met: []? Not Met: []? Adjusted     Therapist goals for Patient:   Short Term Goals: To be achieved in: 4 weeks  3/14/21  1. Independent in HEP and progression per patient tolerance, in order to prevent re-injury. (at 1 week 2/24/21)  []? Progressing: []? Met: []? Not Met: []? Adjusted   2. Patient will have demonstrate [facility] [place] for R hip to improve hip mobility. []? Progressing: []? Met: []? Not Met: []? Adjusted   3. Patient will tolerate 2 miles of walking without increased symptoms or restriction. []? Progressing: []? Met: []? Not Met: []? Adjusted     Long Term Goals: To be achieved in: 8 weeks 4/14/21  1. Disability index score of 0% for the AUGUSTIN to assist with reaching prior level of function. []? Progressing: []? Met: []? Not Met: []? Adjusted  2. Patient will demonstrate increased R hip AROM to [facility] and normal LS mobility to allow for proper joint functioning as indicated by patients Functional Deficits.   []? Progressing: []? Met: []? Not Met: []? Adjusted   3.  Patient will demonstrate an increase in Strength to at least 4+/5 proximal hip and core activation to allow for proper functional mobility as indicated by patients Functional Deficits. []? Progressing: []? Met: []? Not Met: []? Adjusted   4. Patient will return to walking 4 miles without increased symptoms or restriction. []? Progressing: []? Met: []? Not Met: []? Adjusted  5. Patient will return to hiking without increased symptoms or restriction. []? Progressing: []? Met: []? Not Met: []? Adjusted         Overall Progression Towards Functional goals/ Treatment Progress Update:  [] Patient is progressing as expected towards functional goals listed. [] Progression is slowed due to complexities/Impairments listed. [] Progression has been slowed due to co-morbidities. [x] Plan just implemented, too soon to assess goals progression <30days   [] Goals require adjustment due to lack of progress  [] Patient is not progressing as expected and requires additional follow up with physician  [] Other    Prognosis for POC: [x] Good [] Fair  [] Poor      Patient requires continued skilled intervention: [x] Yes  [] No    Treatment/Activity Tolerance:  [] Patient able to complete treatment  [] Patient limited by fatigue  [] Patient limited by pain     [] Patient limited by other medical complications  [] Other: Increased time spent discussing symptoms and pathology with pt. Discussed that R glute strength has improved but deficits do persist, especially when compared to the L. Pt is to perform all unilateral glute strength just on the R side rather than B as he has been at home, to allow for R glute strength to normalize in relation to the L. Pt challenged by elevated bridge for 10\", reported muscle fatigue, did demonstrate good hip position/extension during bridge, VCs to focus on breathing to avoid HS cramp. Pt tolerated neutral clams well, stated it was a little more challenging than traditional clams, VCs to keep hips stacked and avoid rocking of hips. Pt to benefit from PT to address ROM and strength deficits to allow for painfree prolonged walking.      Prognosis: [] Good [] Fair  [] Poor    Patient Requires Follow-up: [x] Yes  [] No    PLAN: See eval  [x] Continue per plan of care [] Alter current plan (see comments)  [] Plan of care initiated [] Hold pending MD visit [] Discharge    Electronically signed by: Ramila Hudson PT, DPT, OCS  Physical Therapist  [phone]  [email or web address]    *If patient does not return for further follow ups after this date. Please consider this as the patients discharge from physical therapy.

## 2021-03-26 ENCOUNTER — HOSPITAL ENCOUNTER (OUTPATIENT)
Dept: PHYSICAL THERAPY | Age: 61
Setting detail: THERAPIES SERIES
Discharge: HOME | End: 2021-03-26
Payer: COMMERCIAL

## 2021-03-26 PROCEDURE — 97110 THERAPEUTIC EXERCISES: CPT | Performed by: PHYSICAL THERAPIST

## 2021-03-26 NOTE — PROGRESS NOTES
The [address] [place] [address] [facility]    Physical Therapy Daily Treatment Note  Date:  3/26/2021    Patient Name:  Rhonda Yusuf    :  1960  MRN: [id number]  Restrictions/Precautions:    Medical/Treatment Diagnosis Information:  Referring Physician: Referring Practitioner: Brooklyn Senior NP                                       Evaluation Date: 2021                                       Medical Diagnosis Information:  Diagnosis: Herniated Lumbar Disc (ICD-722.100)(UAF40-K47.26)   Treatment Diagnosis: M54.5, M25.651                                         Insurance information: PT Insurance Information: PT BENEFITS  FACILITY/ [facility]/ EFFECTIVE 2016/ ACTIVE/ DED 2400 .36/ PAYS 80%/ OOP 5900 .36/ NO VISIT LIMIT MED NEC/ 0 AUTH/ ALL CODES BILLABLE/ TELEHEALTH IS NOT COVERED UNLES COVID RELATED/ FARHEEN REF# [id number]/ 2021 PAG  Has the plan of care been signed (Y/N):        []  Yes  [x]  No     Date of Patient follow up with Physician: 21 epidural, 21 MD      Is this a Progress Report:     []  Yes  [x]  No        If Yes:  Date Range for reporting period:  Beginning  Ending    Progress report will be due (10 Rx or 30 days whichever is less): 61      Recertification will be due (POC Duration  / 90 days whichever is less): 21        Visit # Insurance Allowable Auth Required   10 Unlimited per [place] []  Yes []  No        Functional Scale:    Date assessed:  AUGUSTIN 60% disability    21     Latex Allergy:  [x]NO      []YES  Preferred Language for Healthcare:   [x]English       []other:     Pain level:  0/10     SUBJECTIVE:  Pt states it's been a frustrating week in regards to his symptoms. Pt states that it feels like the leg locks up \"like you can't move the leg. \" Side bending always relieves symptoms but the amount of time for symptom relief can vary.      OBJECTIVE:      Standing Exam Normal Abnormal N/A     Toe walk       x     Heel Walk     x     Side bending x         Pelvic Height x         Fwd Bend- (aberrant juttering or innominate mvmt) x         Extension x         Trendelenburg x         Kemps/Quadrant     x     Stork   x   Minimal pelvic tilt B   SLS/SLS w rotation     x                             Seated Exam Normal Abnormal N/A Comments   Pelvic Height     x     Seated Rotation     x     Seated flexion x         B hip IR    x  Limited on R d/t AAYUSH                           Supine Exam Normal Abnormal N/A Comments   Hip flexion x         Abduction   x   + for tightness   ER   x   + for tightness   IR   x   + for tightness   TAN/Ankush   x   + for tightness  R knee 30cm from table  L knee 18 cm from table   Hip scour     x     SLR x         Crossed SLR x         Supine to sit     x     SI distraction/compression     x     Hip thrust     x                             Prone Exam Normal Abnormal N/A Comments   Prone knee bend     x     Prone hip IR     x     B Achilles reflex/Pheasant     x     PA/Spring     x     Prone Instability test     x     Sacral Spring/thrust     x     Ely's   x   + for tightness B   Posterior chain NM firing       R: glute/HS simultaneous, ES  L: glute, HS, ES      ROM LEFT RIGHT Comments   Lumbar Flex     12 cm using tape measure, C7 to L5   Lumbar Ext     6 cm using tape measure, C7 to L5   Side Bend [facility]     Rotation     Not assessed                          ROM LEFT RIGHT Comments   Hip Flexion WFL WFL Supine   Hip Abd 29 22 Supine   Hip ER 47 35 Supine 90/90   Hip IR 25 25 Supine 90/90   Hip Extension     Not assessed   Knee Ext     Not assessed   Knee Flex     Not assessed   Hamstring Flex 60 72 SLR                                    Strength LEFT RIGHT Comments   Multifidus     Not assessed   Transverse Ab     Not assessed   Hip Flexors 4+/5 4+/5     Hip Abductors 4/5  4/5 glute med 4-/5  4-/5 glute med     Hip Extensors 4/5 4/5     Quads 4+/5 4+/5     Hamstrings 4+/5 4+/5     Hip IR 4/5 4+/5     Hip ER 4+/5 4/5     Plantarflexion 4+/5 4+/5     Dorsiflexion 4+/5 4+/5        Myotomes Normal Abnormal Comments   Hip flexion (L1-L2) x       Knee extension (L2-L4) x       Dorsiflexion (L4-L5) x       Great Toe Ext (L5)     Not assessed   Ankle Eversion (S1-S2)     Not assessed   Ankle PF(S1-S2) x          Not assessed  Dermatomes Normal Abnormal Comments   inguinal area (L1)          anterior mid-thigh (L2)         distal ant thigh/med knee (L3)         medial lower leg and foot (L4)         lateral lower leg and foot (L5)         posterior calf (S1)         medial calcaneus (S2)               Neural dynamic tension testing Normal Abnormal Comments   Slump Test  - Degree of knee flexion:  x       SLR          0-30 x       30-70 x       Femoral nerve (L2-4) x          Not assessed  Reflexes Normal Abnormal Comments   S1-2 Seated achilles         S1-2 Prone knee bend         L3-4 Patellar tendon         C5-6 Biceps         C6 Brachioradialis         C7-8 Triceps         Clonus         Babinski         Mancia's            Joint mobility:              []?Normal                      [x]? Hypo R hip d/t AAYUSH             []?Hyper     Palpation: None     Functional Mobility/Transfers: Independent with all mobility     Posture: WFL     Gait: (include devices/WB status) WFL      RESTRICTIONS/PRECAUTIONS: none    Exercises/Interventions:   ROM/stretches     Quad Prone with strap   Figure 4    Hip IR Hooklying, L leg providing OP into IR   ITB stretch Supine, using LLE for OP   Hamstring Seated     Standing  HEP   Sciatic nerve glide          Strengthening     Clamshells 3x10 R only, neutral hip Sidelying  Blue loop above knees   Reverse clamshell 3x15 7# Pillow between knees   Bridges 10x10\" feet elevated Foam roll between knees     At half wall for support  HEP   TA brace     Supine Deadbug  One leg at 90/90, heel tap to table     HEP   Sidelying hip/knee flexion     ABD SLR with ext     Prone SLR-EOB     sidelying ABD circles 3x10 CW/CCW    Prone hip ER/IR 2x10 2#   Seated hip IR 3x10 2#, EOB   SL squat 3x10To treatment table, LLE on slider  Blue loop around knees        Planks  On forearms; 30\" break between reps   Side plank  Popping in L shoulder when on L side, hold at this time    R: 72\"/63\"   Superman Trunk ext only   Bear plank         Blue loop above knees  HEP   Monster walks Blue loop above knees  Forward/backward   Standing hip ER Feet hip width apart, let RLE fall into valgus, push out against band   Wall sits Blue loop above knees  86\"/78\"/86\"   Lateral step up Focus on glute squeeze   SL leg press R only           SLS    RB          Manual      Hip IR stretch  Supine, 90/90               Plan for next session: progress as tolerated; focus on glute and core strength/endurance    Therapeutic Exercise and NMR EXR  [x] (56248) Provided verbal/tactile cueing for activities related to strengthening, flexibility, endurance, ROM  for improvements in proximal hip and core control with self care, mobility, lifting and ambulation.  [] (64494) Provided verbal/tactile cueing for activities related to improving balance, coordination, kinesthetic sense, posture, motor skill, proprioception  to assist with core control in self care, mobility, lifting, and ambulation. Therapeutic Activities:    [] (43613 or 09552) Provided verbal/tactile cueing for activities related to improving balance, coordination, kinesthetic sense, posture, motor skill, proprioception and motor activation to allow for proper function  with self care and ADLs  [] (23183) Provided training and instruction to the patient for proper core and proximal hip recruitment and positioning with ambulation re-education     Home Exercise Program:  Access Code: [email or web address]   URL: [email or web address]   Date: 02/17/2021   Prepared by: Chu Vallejo     2/17/21. Printed handout provided. Updated 2/24/21. Patient has access via mobile lester. Updated 3/5/21.      Exercises Seated Table Hamstring Stretch - 3 reps - 30 second hold - 1x daily - 7x weekly   Supine Figure 4 Piriformis Stretch - 3 reps - 1 sets - 30 second hold - 1x daily - 7x weekly   Prone Quadriceps Stretch with Strap - 3 reps - 1 sets - 30 second hold - 1x daily - 7x weekly   Standing Quadratus Lumborum Stretch with Doorway - 10 reps - 3 sets - 1x daily - 7x weekly   Clamshell with Resistance - 10 reps - 3 sets - 1x daily - 7x weekly   Supine Bridge - 10 reps - 3 sets - 1x daily - 7x weekly   Supine March - 10 reps - 3 sets - 1x daily - 7x weekly   Sidelying Bent Knee Hip Flexion - 10 reps - 3 sets - 1x daily - 7x weekly   Side Stepping with Resistance at Thighs - 10 reps - 3 sets - 1x daily - 7x weekly   Sidelying Knee Flexion and Extension in Abduction - 10 reps - 3 sets - 1x daily - 7x weekly   Sidelying Hip Abduction - 10 reps - 3 sets - 1x daily - 7x weekly   Squat - 10 reps - 3 sets - 1x daily - 7x weekly   Forward Monster Walks - 10-15 reps - 2-3 sets - 1x daily - 7x weekly   Single Leg Stance - 3 reps - 1 sets - 30 second hold - 1x daily - 7x weekly   Sidelying Reverse Clamshell - 10 reps - 3 sets - 1x daily - 7x weekly        [x] (82064) Reviewed/Progressed HEP activities related to strengthening, flexibility, endurance, ROM of core, proximal hip and LE for functional self-care, mobility, lifting and ambulation   [] (69738) Reviewed/Progressed HEP activities related to improving balance, coordination, kinesthetic sense, posture, motor skill, proprioception of core, proximal hip and LE for self care, mobility, lifting, and ambulation      Manual Treatments:  PROM / STM / Oscillations-Mobs:  G-I, II, III, IV (PA's, Inf., Post.)  [] ([id number]) Provided manual therapy to mobilize proximal hip and LS spine soft tissue/joints for the purpose of modulating pain, promoting relaxation,  increasing ROM, reducing/eliminating soft tissue swelling/inflammation/restriction, improving soft tissue extensibility and allowing for proper ROM for normal function with self care, mobility, lifting and ambulation. Modalities: none      Charges:  Timed Code Treatment Minutes: 38   Total Treatment Minutes: 38   Time in: 10:45  Time out: 11:30    [] EVAL (LOW) 30063 (typically 20 minutes face-to-face)  [] EVAL (MOD) 96069 (typically 30 minutes face-to-face)  [] EVAL (HIGH) 00955 (typically 45 minutes face-to-face)  [] RE-EVAL     [x] JQ(05753) x 3    [] IONTO  [] NMR (68103) x     [] VASO  [] Manual (07937) x      [] Other:  [] TA x      [] Mech Traction (56004)  [] ES(attended) (32863)      [] ES (un) (59480):     Goals:   Patient stated goal: to walk long distances painfree    []? Progressing: []? Met: []? Not Met: []? Adjusted     Therapist goals for Patient:   Short Term Goals: To be achieved in: 4 weeks  3/14/21  1. Independent in HEP and progression per patient tolerance, in order to prevent re-injury. (at 1 week 2/24/21)  []? Progressing: []? Met: []? Not Met: []? Adjusted   2. Patient will have demonstrate [facility]OM [facility] [place] for R hip to improve hip mobility. []? Progressing: []? Met: []? Not Met: []? Adjusted   3. Patient will tolerate 2 miles of walking without increased symptoms or restriction. []? Progressing: []? Met: []? Not Met: []? Adjusted     Long Term Goals: To be achieved in: 8 weeks 4/14/21  1. Disability index score of 0% for the AUGUSTIN to assist with reaching prior level of function. []? Progressing: []? Met: []? Not Met: []? Adjusted  2. Patient will demonstrate increased R hip AROM to [employer] [place] and normal LS mobility to allow for proper joint functioning as indicated by patients Functional Deficits.   []? Progressing: []? Met: []? Not Met: []? Adjusted   3. Patient will demonstrate an increase in Strength to at least 4+/5 proximal hip and core activation to allow for proper functional mobility as indicated by patients Functional Deficits. []? Progressing: []? Met: []? Not Met: []? Adjusted   4.  Patient will return to walking 4 miles without increased symptoms or restriction. []? Progressing: []? Met: []? Not Met: []? Adjusted  5. Patient will return to hiking without increased symptoms or restriction. []? Progressing: []? Met: []? Not Met: []? Adjusted         Overall Progression Towards Functional goals/ Treatment Progress Update:  [] Patient is progressing as expected towards functional goals listed. [] Progression is slowed due to complexities/Impairments listed. [] Progression has been slowed due to co-morbidities. [x] Plan just implemented, too soon to assess goals progression <30days   [] Goals require adjustment due to lack of progress  [] Patient is not progressing as expected and requires additional follow up with physician  [] Other    Prognosis for POC: [x] Good [] Fair  [] Poor      Patient requires continued skilled intervention: [x] Yes  [] No    Treatment/Activity Tolerance:  [] Patient able to complete treatment  [] Patient limited by fatigue  [] Patient limited by pain     [] Patient limited by other medical complications  [] Other: Pt exhibits improved R hip PROM ER and IR. Pt has -SLR, - Slump test, normal quadrant testing, and no pain with repetitive movements. No aspect of physical exam and object findings are consistent with a herniated disc being cause of problem. Pt's symptoms are more consistent with neurogenic claudication as pain is only present with prolonged walking and symptoms are relieved immediately with trunk flexion and SB away from painful side. PT to contact MD for copies of 12/2020 MRI. Pt continues to be challenged by glute based program. Pt requires continued PT to address remaining strength deficits to aid in off loading spine and hip to help decrease symptoms and to allow for pain free prolonged walking.      Prognosis: [] Good [] Fair  [] Poor    Patient Requires Follow-up: [x] Yes  [] No    PLAN: See eval  [x] Continue per plan of care [] Alter current plan (see comments)  [] Plan of care

## 2021-03-30 ENCOUNTER — HOSPITAL ENCOUNTER (OUTPATIENT)
Dept: PHYSICAL THERAPY | Age: 61
Setting detail: THERAPIES SERIES
Discharge: HOME | End: 2021-03-30
Payer: COMMERCIAL

## 2021-03-30 ENCOUNTER — APPOINTMENT (OUTPATIENT)
Dept: PHYSICAL THERAPY | Age: 61
End: 2021-03-30
Payer: COMMERCIAL

## 2021-03-30 PROCEDURE — 97110 THERAPEUTIC EXERCISES: CPT | Performed by: PHYSICAL THERAPIST

## 2021-03-30 PROCEDURE — 97530 THERAPEUTIC ACTIVITIES: CPT | Performed by: PHYSICAL THERAPIST

## 2021-03-30 NOTE — FLOWSHEET NOTE
The [address] [place] [address] [facility]    Physical Therapy Daily Treatment Note  Date:  3/30/2021    Patient Name:  Ilana Mcdaniels    :  1960  MRN: [id number]  Restrictions/Precautions:    Medical/Treatment Diagnosis Information:  Referring Physician: Referring Practitioner: Luis Fernando Cadet NP                                       Evaluation Date: 2021                                       Medical Diagnosis Information:  Diagnosis: Herniated Lumbar Disc (ICD-722.100)(ONU64-V92.26)   Treatment Diagnosis: M54.5, M25.651                                         Insurance information: PT Insurance Information: PT BENEFITS  FACILITY/ C/ EFFECTIVE 2016/ ACTIVE/ DED 2400 .36/ PAYS 80%/ OOP 5900 .36/ NO VISIT LIMIT MED NEC/ 0 AUTH/ ALL CODES BILLABLE/ TELEHEALTH IS NOT COVERED UNLES COVID RELATED/ F[facility]HEEN REF# [id number]/ 2021 PAG  Has the plan of care been signed (Y/N):        []  Yes  [x]  No     Date of Patient follow up with Physician: 21 epidural, 21 MD      Is this a Progress Report:     []  Yes  [x]  No        If Yes:  Date Range for reporting period:  Beginning  Ending    Progress report will be due (10 Rx or 30 days whichever is less):       Recertification will be due (POC Duration  / 90 days whichever is less): 21        Visit # Insurance Allowable Auth Required   11 Unlimited per [place] []  Yes []  No        Functional Scale:    Date assessed:  AUGUSTIN 60% disability    21     Latex Allergy:  [x]NO      []YES  Preferred Language for Healthcare:   [x]English       []other:     Pain level:  0/10     SUBJECTIVE:  Pt states that instruction to flex forward slightly when walking has been very helpful, he has been able to walk the last 5 days without any symptoms in his glute or leg, he has walked level ground as well as hills and various distances.  However, the slight flexion has put some increased strain onto his low back.    OBJECTIVE:   MRI findings: \"severe right foraminal narrowing with effacement and compression of the exiting right L5 nerve\" see media for full report     Standing Exam Normal Abnormal N/A     Toe walk       x     Heel Walk     x     Side bending x         Pelvic Height x         Fwd Bend- (aberrant juttering or innominate mvmt) x         Extension x         Trendelenburg x         Kemps/Quadrant     x     Stork   x   Minimal pelvic tilt B   SLS/SLS w rotation     x                             Seated Exam Normal Abnormal N/A Comments   Pelvic Height     x     Seated Rotation     x     Seated flexion x         B hip IR    x  Limited on R d/t AAYUSH                           Supine Exam Normal Abnormal N/A Comments   Hip flexion x         Abduction   x   + for tightness   ER   x   + for tightness   IR   x   + for tightness   TAN/Ankush   x   + for tightness  R knee 30cm from table  L knee 18 cm from table   Hip scour     x     SLR x         Crossed SLR x         Supine to sit     x     SI distraction/compression     x     Hip thrust     x                             Prone Exam Normal Abnormal N/A Comments   Prone knee bend     x     Prone hip IR     x     B Achilles reflex/Pheasant     x     PA/Spring     x     Prone Instability test     x     Sacral Spring/thrust     x     Ely's   x   + for tightness B   Posterior chain NM firing       R: glute/HS simultaneous, ES  L: glute, HS, ES      ROM LEFT RIGHT Comments   Lumbar Flex     12 cm using tape measure, C7 to L5   Lumbar Ext     6 cm using tape measure, C7 to L5   Side Bend [facility]     Rotation     Not assessed                          ROM LEFT RIGHT Comments   Hip Flexion WFL WFL Supine   Hip Abd 29 22 Supine   Hip ER 47 35 Supine 90/90   Hip IR 25 25 Supine 90/90   Hip Extension     Not assessed   Knee Ext     Not assessed   Knee Flex     Not assessed   Hamstring Flex 60 72 SLR                                    Strength LEFT RIGHT Comments   Multifidus     Not assessed   Transverse Ab     Not assessed   Hip Flexors 4+/5 4+/5     Hip Abductors 4/5  4/5 glute med 4-/5  4-/5 glute med     Hip Extensors 4/5 4/5     Quads 4+/5 4+/5     Hamstrings 4+/5 4+/5     Hip IR 4/5 4+/5     Hip ER 4+/5 4/5     Plantarflexion 4+/5 4+/5     Dorsiflexion 4+/5 4+/5        Myotomes Normal Abnormal Comments   Hip flexion (L1-L2) x       Knee extension (L2-L4) x       Dorsiflexion (L4-L5) x       Great Toe Ext (L5)     Not assessed   Ankle Eversion (S1-S2)     Not assessed   Ankle PF(S1-S2) x          Not assessed  Dermatomes Normal Abnormal Comments   inguinal area (L1)          anterior mid-thigh (L2)         distal ant thigh/med knee (L3)         medial lower leg and foot (L4)         lateral lower leg and foot (L5)         posterior calf (S1)         medial calcaneus (S2)               Neural dynamic tension testing Normal Abnormal Comments   Slump Test  - Degree of knee flexion:  x       SLR          0-30 x       30-70 x       Femoral nerve (L2-4) x          Not assessed  Reflexes Normal Abnormal Comments   S1-2 Seated achilles         S1-2 Prone knee bend         L3-4 Patellar tendon         C5-6 Biceps         C6 Brachioradialis         C7-8 Triceps         Clonus         Babinski         Mancia's            Joint mobility:              []?Normal                      [x]? Hypo R hip d/t AAYUSH             []?Hyper     Palpation: None     Functional Mobility/Transfers: Independent with all mobility     Posture: WFL     Gait: (include devices/WB status) WFL      RESTRICTIONS/PRECAUTIONS: none    Exercises/Interventions:   ROM/stretches     Quad Prone with strap   Figure 4    Hip IR Hooklying, L leg providing OP into IR   ITB stretch Supine, using LLE for OP   Hamstring Seated     Standing  HEP   Sciatic nerve glide     Child's pose 3x30\"         Strengthening     Clamshells Sidelying  Blue loop above knees   Reverse clamshell Pillow between knees   Bridges 10x10\" feet elevated Foam roll between knees     At half wall for support  HEP   TA brace     Supine Deadbug  One leg at 90/90, heel tap to table     HEP   Sidelying hip/knee flexion     ABD SLR with ext     Prone SLR-EOB     sidelying ABD circles    Prone hip ER/IR    Seated hip IR    SL squat To treatment table, LLE on slider  Blue loop around knees        Planks 2xfatigue On forearms; 30\" break between reps   Side plank  Popping in L shoulder when on L side, hold at this time    R: 72\"/63\"   Superman Trunk ext only   Bear plank    TA slide x10 B Uninvolved leg in 90/90, involved leg sliding in and out of hooklying   Isometric dead bug 5x10\" B    Bird dog x10 B LE only        Blue loop above knees  HEP   Monster walks Blue loop above knees  Forward/backward   Standing hip ER Feet hip width apart, let RLE fall into valgus, push out against band   Wall sits Blue loop above knees  86\"/78\"/86\"   Lateral step up Focus on glute squeeze   SL leg press R only           SLS    RB          Manual      Hip IR stretch  Supine, 90/90               Plan for next session: progress as tolerated; focus on glute and core strength/endurance    Therapeutic Exercise and NMR EXR  [x] (74275) Provided verbal/tactile cueing for activities related to strengthening, flexibility, endurance, ROM  for improvements in proximal hip and core control with self care, mobility, lifting and ambulation.  [] (39051) Provided verbal/tactile cueing for activities related to improving balance, coordination, kinesthetic sense, posture, motor skill, proprioception  to assist with core control in self care, mobility, lifting, and ambulation.      Therapeutic Activities:    [] (27871 or 21216) Provided verbal/tactile cueing for activities related to improving balance, coordination, kinesthetic sense, posture, motor skill, proprioception and motor activation to allow for proper function  with self care and ADLs  [] (49707) Provided training and instruction to the patient for proper core and therapy to mobilize proximal hip and LS spine soft tissue/joints for the purpose of modulating pain, promoting relaxation,  increasing ROM, reducing/eliminating soft tissue swelling/inflammation/restriction, improving soft tissue extensibility and allowing for proper ROM for normal function with self care, mobility, lifting and ambulation. Modalities: none      Charges:  Timed Code Treatment Minutes: 48   Total Treatment Minutes: 48   Time in: 1:15  Time out: 2:03    [] EVAL (LOW) 90397 (typically 20 minutes face-to-face)  [] EVAL (MOD) 56848 (typically 30 minutes face-to-face)  [] EVAL (HIGH) 87767 (typically 45 minutes face-to-face)  [] RE-EVAL     [x] DA(93044) x 1    [] IONTO  [] NMR (42331) x     [] VASO  [] Manual (40976) x      [] Other:  [x] TA x 2     [] Mech Traction (03916)  [] ES(attended) (39944)      [] ES (un) (77819):     Goals:   Patient stated goal: to walk long distances painfree    []? Progressing: []? Met: []? Not Met: []? Adjusted     Therapist goals for Patient:   Short Term Goals: To be achieved in: 4 weeks  3/14/21  1. Independent in HEP and progression per patient tolerance, in order to prevent re-injury. (at 1 week 2/24/21)  []? Progressing: []? Met: []? Not Met: []? Adjusted   2. Patient will have demonstrate PROM [facility]SH[place]/[email or web address] [email or web address] [place] for R hip to improve hip mobility. []? Progressing: []? Met: []? Not Met: []? Adjusted   3. Patient will tolerate 2 miles of walking without increased symptoms or restriction. []? Progressing: []? Met: []? Not Met: []? Adjusted     Long Term Goals: To be achieved in: 8 weeks 4/14/21  1. Disability index score of 0% for the AUGUSTIN to assist with reaching prior level of function. []? Progressing: []? Met: []? Not Met: []? Adjusted  2. Patient will demonstrate increased R hip AROM to [facility] [place] and normal LS mobility to allow for proper joint functioning as indicated by patients Functional Deficits.   []? Progressing: []? Met: []? Not Met: []? Adjusted   3.  Patient will demonstrate an increase in Strength to at least 4+/5 proximal hip and core activation to allow for proper functional mobility as indicated by patients Functional Deficits. []? Progressing: []? Met: []? Not Met: []? Adjusted   4. Patient will return to walking 4 miles without increased symptoms or restriction. []? Progressing: []? Met: []? Not Met: []? Adjusted  5. Patient will return to hiking without increased symptoms or restriction. []? Progressing: []? Met: []? Not Met: []? Adjusted         Overall Progression Towards Functional goals/ Treatment Progress Update:  [] Patient is progressing as expected towards functional goals listed. [] Progression is slowed due to complexities/Impairments listed. [] Progression has been slowed due to co-morbidities. [x] Plan just implemented, too soon to assess goals progression <30days   [] Goals require adjustment due to lack of progress  [] Patient is not progressing as expected and requires additional follow up with physician  [] Other    Prognosis for POC: [x] Good [] Fair  [] Poor      Patient requires continued skilled intervention: [x] Yes  [] No    Treatment/Activity Tolerance:  [] Patient able to complete treatment  [] Patient limited by fatigue  [] Patient limited by pain     [] Patient limited by other medical complications  [] Other: Pt responded well to flexion posturing while walking, this supports that R sided symptoms are d/t foraminal compression on R L5 nerve root as seen on MRI. Pt's symptoms are not consistent with HNP. Adjusted exercise program this date to focus more on core strength than glute as was initial focus. Adequate core strength will allow pt to maintain better lumbar alignment and promote flexion posture rather than falling into lumbar extension. Goal of core strength is to prevent low back discomfort with flexion position while walking. Pt tolerated session well and without exacerbation of symptoms.  Pt challenged by bird dog, difficulty maintain steady body position as UE and LE were lifted. Pt requires continued PT to address remaining strength deficits to aid in off loading spine and hip to help decrease symptoms and to allow for pain free prolonged walking. Prognosis: [] Good [] Fair  [] Poor    Patient Requires Follow-up: [x] Yes  [] No    PLAN: See eval  [x] Continue per plan of care [] Alter current plan (see comments)  [] Plan of care initiated [] Hold pending MD visit [] Discharge    Electronically signed by: Jess Delcid PT, DPT, OCS  Physical Therapist  [id number]  [email or web address]    *If patient does not return for further follow ups after this date. Please consider this as the patients discharge from physical therapy.

## 2021-04-02 ENCOUNTER — APPOINTMENT (OUTPATIENT)
Dept: PHYSICAL THERAPY | Age: 61
End: 2021-04-02
Payer: COMMERCIAL

## 2021-04-07 ENCOUNTER — HOSPITAL ENCOUNTER (OUTPATIENT)
Dept: PHYSICAL THERAPY | Age: 61
Setting detail: THERAPIES SERIES
Discharge: HOME | End: 2021-04-07
Payer: COMMERCIAL

## 2021-04-07 PROCEDURE — 97110 THERAPEUTIC EXERCISES: CPT | Performed by: PHYSICAL THERAPIST

## 2021-04-07 PROCEDURE — 97530 THERAPEUTIC ACTIVITIES: CPT | Performed by: PHYSICAL THERAPIST

## 2021-04-07 NOTE — FLOWSHEET NOTE
with effacement and compression of the exiting right L5 nerve\" see media for full report     Standing Exam Normal Abnormal N/A     Toe walk       x     Heel Walk     x     Side bending x         Pelvic Height x         Fwd Bend- (aberrant juttering or innominate mvmt) x         Extension x         Trendelenburg x         Kemps/Quadrant     x     Stork   x   Minimal pelvic tilt B   SLS/SLS w rotation     x                             Seated Exam Normal Abnormal N/A Comments   Pelvic Height     x     Seated Rotation     x     Seated flexion x         B hip IR    x  Limited on R d/t AAYUSH                           Supine Exam Normal Abnormal N/A Comments   Hip flexion x         Abduction   x   + for tightness   ER   x   + for tightness   IR   x   + for tightness   TAN/Ankush   x   + for tightness  R knee 30cm from table  L knee 18 cm from table   Hip scour     x     SLR x         Crossed SLR x         Supine to sit     x     SI distraction/compression     x     Hip thrust     x                             Prone Exam Normal Abnormal N/A Comments   Prone knee bend     x     Prone hip IR     x     B Achilles reflex/Pheasant     x     PA/Spring     x     Prone Instability test     x     Sacral Spring/thrust     x     Ely's   x   + for tightness B   Posterior chain NM firing       R: glute/HS simultaneous, ES  L: glute, HS, ES      ROM LEFT RIGHT Comments   Lumbar Flex     12 cm using tape measure, C7 to L5   Lumbar Ext     6 cm using tape measure, C7 to L5   Side Bend [place]     Rotation     Not assessed                          ROM LEFT RIGHT Comments   Hip Flexion WFL WFL Supine   Hip Abd 29 22 Supine   Hip ER 47 35 Supine 90/90   Hip IR 25 25 Supine 90/90   Hip Extension     Not assessed   Knee Ext     Not assessed   Knee Flex     Not assessed   Hamstring Flex 60 72 SLR                                    Strength LEFT RIGHT Comments   Multifidus     Not assessed   Transverse Ab     Not assessed   Hip Flexors 4+/5 4+/5     Hip Abductors 4/5  4/5 glute med 4-/5  4-/5 glute med     Hip Extensors 4/5 4/5     Quads 4+/5 4+/5     Hamstrings 4+/5 4+/5     Hip IR 4/5 4+/5     Hip ER 4+/5 4/5     Plantarflexion 4+/5 4+/5     Dorsiflexion 4+/5 4+/5        Myotomes Normal Abnormal Comments   Hip flexion (L1-L2) x       Knee extension (L2-L4) x       Dorsiflexion (L4-L5) x       Great Toe Ext (L5)     Not assessed   Ankle Eversion (S1-S2)     Not assessed   Ankle PF(S1-S2) x          Not assessed  Dermatomes Normal Abnormal Comments   inguinal area (L1)          anterior mid-thigh (L2)         distal ant thigh/med knee (L3)         medial lower leg and foot (L4)         lateral lower leg and foot (L5)         posterior calf (S1)         medial calcaneus (S2)               Neural dynamic tension testing Normal Abnormal Comments   Slump Test  - Degree of knee flexion:  x       SLR          0-30 x       30-70 x       Femoral nerve (L2-4) x          Not assessed  Reflexes Normal Abnormal Comments   S1-2 Seated achilles         S1-2 Prone knee bend         L3-4 Patellar tendon         C5-6 Biceps         C6 Brachioradialis         C7-8 Triceps         Clonus         Babinski         Mancia's            Joint mobility:              []?Normal                      [x]? Hypo R hip d/t AAYUSH             []?Hyper     Palpation: None     Functional Mobility/Transfers: Independent with all mobility     Posture: WFL     Gait: (include devices/WB status) WFL      RESTRICTIONS/PRECAUTIONS: none    Exercises/Interventions:   ROM/stretches     Quad Prone with strap   Figure 4    Hip IR Hooklying, L leg providing OP into IR   ITB stretch Supine, using LLE for OP   Hamstring Seated     Standing  HEP   Sciatic nerve glide     Child's pose          Strengthening     Clamshells Sidelying  Blue loop above knees   Reverse clamshell Pillow between knees   Bridges 10x10\" feet elevated Foam roll between knees     At half wall for support  HEP   TA brace     Supine Provided training and instruction to the patient for proper core and proximal hip recruitment and positioning with ambulation re-education     Home Exercise Program:  Access Code: [email or web address]   URL: [email or web address]   Date: 02/17/2021   Prepared by: Rae Ashton     2/17/21. Printed handout provided. Updated 2/24/21. Patient has access via mobile lester. Updated 3/5/21. Updated 3/30/21/  Updated 4/7/21.     Exercises   Supine Sciatic Nerve Glide - 1 x daily - 5 x weekly - 10-15 reps - 1 sets   Child's Pose with Sidebending - 1 x daily - 5 x weekly - 1 sets - 3 reps - 30 seconds hold   Supine ITB Stretch - 1 x daily - 5 x weekly - 3 sets - 10 reps   Supine Bridge - 1 x daily - 5 x weekly - 10 reps - 1 sets - 10 second hold   Sidelying Bent Knee Hip Flexion - 1 x daily - 5 x weekly - 10 reps - 3 sets   Forward Monster Walks - 1 x daily - 5 x weekly - 10-15 reps - 2-3 sets   Hooklying Isometric Hip Flexion - 1 x daily - 5 x weekly - 1 sets - 5-10 reps - 10 second hold   Supine Transversus Abdominis Bracing with Leg Extension - 1 x daily - 5 x weekly - 2 sets - 10 reps   Bird Dog - 1 x daily - 5 x weekly - 1-2 sets - 10 reps   Standard Plank - 1 x daily - 5 x weekly - 1 sets - 3 reps - fatigue hold   Standing Anti-Rotation Press with Anchored Resistance - 1 x daily - 5 x weekly - 1 sets - 5-10 reps - 10 second hold   Seated Table Hamstring Stretch - 1 x daily - 5 x weekly - 3 reps - 30 second hold   Supine Figure 4 Piriformis Stretch - 1 x daily - 5 x weekly - 3 reps - 1 sets - 30 second hold   Prone Quadriceps Stretch with Strap - 1 x daily - 5 x weekly - 3 reps - 1 sets - 30 second hold     [x] (40092) Reviewed/Progressed HEP activities related to strengthening, flexibility, endurance, ROM of core, proximal hip and LE for functional self-care, mobility, lifting and ambulation   [] (22188) Reviewed/Progressed HEP activities related to improving balance, coordination, kinesthetic sense, posture, motor skill, proprioception of core, proximal hip and LE for self care, mobility, lifting, and ambulation      Manual Treatments:    [] (42745) Provided manual therapy to mobilize proximal hip and LS spine soft tissue/joints for the purpose of modulating pain, promoting relaxation,  increasing ROM, reducing/eliminating soft tissue swelling/inflammation/restriction, improving soft tissue extensibility and allowing for proper ROM for normal function with self care, mobility, lifting and ambulation. Modalities: none      Charges:  Timed Code Treatment Minutes: 37   Total Treatment Minutes: 37   Time in: 1:15  Time out: 2:03    [] EVAL (LOW) 46728 (typically 20 minutes face-to-face)  [] EVAL (MOD) 15618 (typically 30 minutes face-to-face)  [] EVAL (HIGH) 69070 (typically 45 minutes face-to-face)  [] RE-EVAL     [x] RN(63263) x 1    [] IONTO  [] NMR (18624) x     [] VASO  [] Manual (04102) x      [] Other:  [x] TA x 1     [] Mech Traction (62913)  [] ES(attended) (08591)      [] ES (un) (70067):     Goals:   Patient stated goal: to walk long distances painfree    []? Progressing: []? Met: []? Not Met: []? Adjusted     Therapist goals for Patient:   Short Term Goals: To be achieved in: 4 weeks  3/14/21  1. Independent in HEP and progression per patient tolerance, in order to prevent re-injury. (at 1 week 2/24/21)  []? Progressing: []? Met: []? Not Met: []? Adjusted   2. Patient will have demonstrate PROM [facility] [place] for R hip to improve hip mobility. []? Progressing: []? Met: []? Not Met: []? Adjusted   3. Patient will tolerate 2 miles of walking without increased symptoms or restriction. []? Progressing: []? Met: []? Not Met: []? Adjusted     Long Term Goals: To be achieved in: 8 weeks 4/14/21  1. Disability index score of 0% for the AUGUSTIN to assist with reaching prior level of function. []? Progressing: []? Met: []? Not Met: []? Adjusted  2.  Patient will demonstrate increased R hip AROM to [facility] and normal LS mobility to allow for proper joint functioning as indicated by patients Functional Deficits.   []? Progressing: []? Met: []? Not Met: []? Adjusted   3. Patient will demonstrate an increase in Strength to at least 4+/5 proximal hip and core activation to allow for proper functional mobility as indicated by patients Functional Deficits. []? Progressing: []? Met: []? Not Met: []? Adjusted   4. Patient will return to walking 4 miles without increased symptoms or restriction. []? Progressing: []? Met: []? Not Met: []? Adjusted  5. Patient will return to hiking without increased symptoms or restriction. []? Progressing: []? Met: []? Not Met: []? Adjusted         Overall Progression Towards Functional goals/ Treatment Progress Update:  [] Patient is progressing as expected towards functional goals listed. [] Progression is slowed due to complexities/Impairments listed. [] Progression has been slowed due to co-morbidities. [x] Plan just implemented, too soon to assess goals progression <30days   [] Goals require adjustment due to lack of progress  [] Patient is not progressing as expected and requires additional follow up with physician  [] Other    Prognosis for POC: [x] Good [] Fair  [] Poor      Patient requires continued skilled intervention: [x] Yes  [] No    Treatment/Activity Tolerance:  [] Patient able to complete treatment  [] Patient limited by fatigue  [] Patient limited by pain     [] Patient limited by other medical complications  [] Other: Pt continues to respond well to slight flexion with prolonged ambulation. Pt tolerated advancement of core program well, continues to report muscle fatigue. Continues to require reminders to maintain normal breathing during exercises rather than holding breath. Pt exhibited slight trunk extension with OH ball reaches. Pt requires continued PT to address remaining strength deficits to aid in off loading spine and hip to help decrease symptoms and to allow for pain free prolonged walking. Prognosis: [] Good [] Fair  [] Poor    Patient Requires Follow-up: [x] Yes  [] No    PLAN: See eval  [x] Continue per plan of care [] Alter current plan (see comments)  [] Plan of care initiated [] Hold pending MD visit [] Discharge    Electronically signed by: Barb Kenny PT, DPT, OCS  Physical Therapist  [id number]  [email or web address]    *If patient does not return for further follow ups after this date. Please consider this as the patients discharge from physical therapy. 17.7

## 2021-04-14 ENCOUNTER — HOSPITAL ENCOUNTER (OUTPATIENT)
Dept: PHYSICAL THERAPY | Age: 61
Setting detail: THERAPIES SERIES
Discharge: HOME | End: 2021-04-14
Payer: COMMERCIAL

## 2021-04-14 PROCEDURE — 97530 THERAPEUTIC ACTIVITIES: CPT | Performed by: PHYSICAL THERAPIST

## 2021-04-14 PROCEDURE — 97112 NEUROMUSCULAR REEDUCATION: CPT | Performed by: PHYSICAL THERAPIST

## 2021-04-14 NOTE — DISCHARGE SUMMARY
[address] [facility]    [facility] Daily Treatment Note  Date:  2021    Patient Name:  Winnie Guerra    :  1960  MRN: [id number]  Restrictions/Precautions:    Medical/Treatment Diagnosis Information:  Referring Physician: Referring Practitioner: Amena Perez NP                                       Evaluation Date: 2021                                       Medical Diagnosis Information:  Diagnosis: Herniated Lumbar Disc (ICD-722.100)(LRX04-X97.26)   Treatment Diagnosis: M54.5, M25.651                                         Insurance information: PT Insurance Information: PT BENEFITS  FACILITY/ [facility]/ EFFECTIVE 2016/ ACTIVE/ DED 2400 .36/ PAYS 80%/ OOP 5900 .36/ NO VISIT LIMIT MED NEC/ 0 AUTH/ ALL CODES BILLABLE/ TELEHEALTH IS NOT COVERED UNLES COVID RELATED/ FARHEEN REF# [id number]/ 2021 PAG  Has the plan of care been signed (Y/N):        []  Yes  [x]  No     Date of Patient follow up with Physician: 21 epidural, 21 MD      Is this a Progress Report:     []  Yes  [x]  No        If Yes:  Date Range for reporting period:  Beginning  Ending    Progress report will be due (10 Rx or 30 days whichever is less):       Recertification will be due (POC Duration  / 90 days whichever is less): 21         Visit # Insurance Allowable Auth Required   12 Unlimited per [place] []  Yes []  No        Functional Scale:    Date assessed:  AUGUSTIN 60% disability    21  AUGUSTIN 0% disability    21     Latex Allergy:  [x]NO      []YES  Preferred Language for Healthcare:   [x]English       []other:     Pain level:  0/10     SUBJECTIVE:  Pt states that the injection only helped for about 3 days. He did go for a walk with normal upright posture and had symptoms, so he resumed walking with slight trunk flexion and has been fine.  States that back soreness with flexed walking is mildly present but has improved greatly since first trying flexed walking. Pt states that palloff press is easy at home.     OBJECTIVE:   MRI findings: \"severe right foraminal narrowing with effacement and compression of the exiting right L5 nerve\" see media for full report     Standing Exam Normal Abnormal N/A     Toe walk       x     Heel Walk     x     Side bending x         Pelvic Height x         Fwd Bend- (aberrant juttering or innominate mvmt) x         Extension x         Trendelenburg x         Kemps/Quadrant     x     Stork   x   Minimal pelvic tilt B   SLS/SLS w rotation     x                             Seated Exam Normal Abnormal N/A Comments   Pelvic Height     x     Seated Rotation     x     Seated flexion x         B hip IR    x  Limited on R d/t AAYUSH                           Supine Exam Normal Abnormal N/A Comments   Hip flexion x         Abduction   x   + for tightness   ER   x   + for tightness   IR   x   + for tightness   TAN/Ankush   x   + for tightness  R knee 30cm from table  L knee 18 cm from table   Hip scour     x     SLR x         Crossed SLR x         Supine to sit     x     SI distraction/compression     x     Hip thrust     x                             Prone Exam Normal Abnormal N/A Comments   Prone knee bend     x     Prone hip IR     x     B Achilles reflex/Pheasant     x     PA/Spring     x     Prone Instability test     x     Sacral Spring/thrust     x     Ely's   x   + for tightness B   Posterior chain NM firing       R: glute/HS simultaneous, ES  L: glute, HS, ES      ROM LEFT RIGHT Comments   Lumbar Flex     12 cm using tape measure, C7 to L5   Lumbar Ext     6 cm using tape measure, C7 to L5   Side Bend [facility]     Rotation     Not assessed                          ROM LEFT RIGHT Comments   Hip Flexion WFL WFL Supine   Hip Abd 29 22 Supine   Hip ER 47 35 Supine 90/90   Hip IR 25 25 Supine 90/90   Hip Extension     Not assessed   Knee Ext     Not assessed   Knee Flex     Not assessed   Hamstring Flex 60 72 SLR                                  Strength LEFT RIGHT Comments   Multifidus     Not assessed   Transverse Ab     Not assessed   Hip Flexors 4+/5 4+/5     Hip Abductors 4/5  4/5 glute med 4-/5  4-/5 glute med     Hip Extensors 4/5 4/5     Quads 4+/5 4+/5     Hamstrings 4+/5 4+/5     Hip IR 4/5 4+/5     Hip ER 4+/5 4/5     Plantarflexion 4+/5 4+/5     Dorsiflexion 4+/5 4+/5        Myotomes Normal Abnormal Comments   Hip flexion (L1-L2) x       Knee extension (L2-L4) x       Dorsiflexion (L4-L5) x       Great Toe Ext (L5)     Not assessed   Ankle Eversion (S1-S2)     Not assessed   Ankle PF(S1-S2) x          Not assessed  Dermatomes Normal Abnormal Comments   inguinal area (L1)          anterior mid-thigh (L2)         distal ant thigh/med knee (L3)         medial lower leg and foot (L4)         lateral lower leg and foot (L5)         posterior calf (S1)         medial calcaneus (S2)               Neural dynamic tension testing Normal Abnormal Comments   Slump Test  - Degree of knee flexion:  x       SLR          0-30 x       30-70 x       Femoral nerve (L2-4) x          Not assessed  Reflexes Normal Abnormal Comments   S1-2 Seated achilles         S1-2 Prone knee bend         L3-4 Patellar tendon         C5-6 Biceps         C6 Brachioradialis         C7-8 Triceps         Clonus         Babinski         Mancia's            Joint mobility:              []?Normal                      [x]? Hypo R hip d/t AAYUSH             []?Hyper     Palpation: None     Functional Mobility/Transfers: Independent with all mobility     Posture: WFL     Gait: (include devices/WB status) WFL      RESTRICTIONS/PRECAUTIONS: none    Exercises/Interventions:   ROM/stretches     Quad Prone with strap   Figure 4    Hip IR Hooklying, L leg providing OP into IR   ITB stretch Supine, using LLE for OP   Hamstring Seated     Standing  HEP   Sciatic nerve glide     Child's pose          Strengthening     Clamshells Sidelying  Blue loop above knees   Reverse clamshell Pillow between knees   Bridges 10x10\" feet elevated Foam roll between knees     At half wall for support  HEP   TA brace     Supine Deadbug  One leg at 90/90, heel tap to table     HEP   Sidelying hip/knee flexion 3x12 3#    ABD SLR with ext     Prone SLR-EOB     sidelying ABD circles    Prone hip ER/IR    Seated hip IR    SL squat To treatment table, LLE on slider  Blue loop around knees        Planks  On forearms; 30\" break between reps   Side plank  Popping in L shoulder when on L side, hold at this time    R: 72\"/63\"   Superman Trunk ext only   Bear plank    TA slide  Uninvolved leg in 90/90, involved leg sliding in and out of hooklying   Isometric dead bug 10x10\" B Legs in 90/90   Bird dog x10 B Opp UE/LE   Palloff press     TB trunk rotation 3x8-10 silver standing   OH reach  One LE hooklying, one 90/90, alternate position of leg per set        Blue loop above knees  HEP   Monster walks 3 passesBlue loop above knees  Forward/backward   Standing hip ER Feet hip width apart, let RLE fall into valgus, push out against band   Wall sits    Lateral step up Focus on glute squeeze   SL leg press R only   Maloney's carry 3 passes, 20/30/30#       SLS    RB      Tandem stance          Manual      Hip IR stretch  Supine, 90/90               Plan for next session: progress as tolerated; focus on glute and core strength/endurance    Therapeutic Exercise and NMR EXR  [x] (98240) Provided verbal/tactile cueing for activities related to strengthening, flexibility, endurance, ROM  for improvements in proximal hip and core control with self care, mobility, lifting and ambulation.  [] (05121) Provided verbal/tactile cueing for activities related to improving balance, coordination, kinesthetic sense, posture, motor skill, proprioception  to assist with core control in self care, mobility, lifting, and ambulation.      Therapeutic Activities:    [] (29185 or [id number]) Provided verbal/tactile cueing for activities related to 10 reps   Sidelying Bent Knee Hip Flexion - 1 x daily - 3 x weekly - 3 sets - 12 reps   Sidelying Knee Flexion and Extension in Abduction - 1 x daily - 3 x weekly - 3 sets - 12 reps       [x] (34380) Reviewed/Progressed HEP activities related to strengthening, flexibility, endurance, ROM of core, proximal hip and LE for functional self-care, mobility, lifting and ambulation   [] (98751) Reviewed/Progressed HEP activities related to improving balance, coordination, kinesthetic sense, posture, motor skill, proprioception of core, proximal hip and LE for self care, mobility, lifting, and ambulation      Manual Treatments:    [] (17087) Provided manual therapy to mobilize proximal hip and LS spine soft tissue/joints for the purpose of modulating pain, promoting relaxation,  increasing ROM, reducing/eliminating soft tissue swelling/inflammation/restriction, improving soft tissue extensibility and allowing for proper ROM for normal function with self care, mobility, lifting and ambulation. Modalities: none      Charges:  Timed Code Treatment Minutes: 43   Total Treatment Minutes: 43   Time in: 10:02  Time out: 10:45    [] EVAL (LOW) 07890 (typically 20 minutes face-to-face)  [] EVAL (MOD) 53652 (typically 30 minutes face-to-face)  [] EVAL (HIGH) 50625 (typically 45 minutes face-to-face)  [] RE-EVAL     [] AC(92262) x     [] IONTO  [x] NMR (94697) x 1     [] VASO  [] Manual (12142) x      [] Other:  [x] TA x 2     [] Mech Traction (73017)  [] ES(attended) (74110)      [] ES (un) (28881):     Goals:   Patient stated goal: to walk long distances painfree    []? Progressing: []? Met: []? Not Met: []? Adjusted     Therapist goals for Patient:   Short Term Goals: To be achieved in: 4 weeks  3/14/21  1. Independent in HEP and progression per patient tolerance, in order to prevent re-injury. (at 1 week 2/24/21)  []? Progressing: []? Met: []? Not Met: []? Adjusted   2.  Patient will have demonstrate PROM [facility] for R hip to improve hip mobility. []? Progressing: []? Met: []? Not Met: []? Adjusted   3. Patient will tolerate 2 miles of walking without increased symptoms or restriction. []? Progressing: []? Met: []? Not Met: []? Adjusted     Long Term Goals: To be achieved in: 8 weeks 4/14/21  1. Disability index score of 0% for the AUGUSTIN to assist with reaching prior level of function. []? Progressing: [x]? Met: []? Not Met: []? Adjusted  2. Patient will demonstrate increased R hip AROM to [facility] and normal LS mobility to allow for proper joint functioning as indicated by patients Functional Deficits.   []? Progressing: [x]? Met: []? Not Met: []? Adjusted   3. Patient will demonstrate an increase in Strength to at least 4+/5 proximal hip and core activation to allow for proper functional mobility as indicated by patients Functional Deficits. []? Progressing: [x]? Met: []? Not Met: []? Adjusted   4. Patient will return to walking 4 miles without increased symptoms or restriction. []? Progressing: [x]? Met: []? Not Met: []? Adjusted  5. Patient will return to hiking without increased symptoms or restriction. []? Progressing: []? Met: [x]? Not Met: not attempted yet by pt []? Adjusted         Overall Progression Towards Functional goals/ Treatment Progress Update:  [] Patient is progressing as expected towards functional goals listed. [] Progression is slowed due to complexities/Impairments listed. [] Progression has been slowed due to co-morbidities.   [x] Plan just implemented, too soon to assess goals progression <30days   [] Goals require adjustment due to lack of progress  [] Patient is not progressing as expected and requires additional follow up with physician  [] Other    Prognosis for POC: [x] Good [] Fair  [] Poor      Patient requires continued skilled intervention: [x] Yes  [] No    Treatment/Activity Tolerance:  [] Patient able to complete treatment  [] Patient limited by fatigue  [] Patient limited by pain     []

## 2021-04-21 ENCOUNTER — APPOINTMENT (OUTPATIENT)
Dept: PHYSICAL THERAPY | Age: 61
End: 2021-04-21
Payer: COMMERCIAL

## 2021-04-27 ENCOUNTER — APPOINTMENT (OUTPATIENT)
Dept: PHYSICAL THERAPY | Age: 61
End: 2021-04-27
Payer: COMMERCIAL